# Patient Record
Sex: FEMALE | Race: WHITE | NOT HISPANIC OR LATINO
[De-identification: names, ages, dates, MRNs, and addresses within clinical notes are randomized per-mention and may not be internally consistent; named-entity substitution may affect disease eponyms.]

---

## 2018-04-20 ENCOUNTER — APPOINTMENT (OUTPATIENT)
Dept: NEUROLOGY | Facility: CLINIC | Age: 73
End: 2018-04-20
Payer: MEDICARE

## 2018-04-20 VITALS
DIASTOLIC BLOOD PRESSURE: 60 MMHG | SYSTOLIC BLOOD PRESSURE: 123 MMHG | BODY MASS INDEX: 25.3 KG/M2 | HEIGHT: 64.5 IN | WEIGHT: 150 LBS

## 2018-04-20 DIAGNOSIS — E03.9 HYPOTHYROIDISM, UNSPECIFIED: ICD-10-CM

## 2018-04-20 DIAGNOSIS — Z82.3 FAMILY HISTORY OF STROKE: ICD-10-CM

## 2018-04-20 DIAGNOSIS — M10.9 GOUT, UNSPECIFIED: ICD-10-CM

## 2018-04-20 DIAGNOSIS — Z87.39 PERSONAL HISTORY OF OTHER DISEASES OF THE MUSCULOSKELETAL SYSTEM AND CONNECTIVE TISSUE: ICD-10-CM

## 2018-04-20 DIAGNOSIS — F32.9 MAJOR DEPRESSIVE DISORDER, SINGLE EPISODE, UNSPECIFIED: ICD-10-CM

## 2018-04-20 DIAGNOSIS — Z87.442 PERSONAL HISTORY OF URINARY CALCULI: ICD-10-CM

## 2018-04-20 DIAGNOSIS — Z78.9 OTHER SPECIFIED HEALTH STATUS: ICD-10-CM

## 2018-04-20 PROCEDURE — 99204 OFFICE O/P NEW MOD 45 MIN: CPT

## 2018-04-20 RX ORDER — POTASSIUM CHLORIDE 20 MEQ
20 TABLET, EXT RELEASE, PARTICLES/CRYSTALS ORAL
Refills: 0 | Status: ACTIVE | COMMUNITY

## 2018-04-20 RX ORDER — TRAZODONE HYDROCHLORIDE 100 MG/1
100 TABLET ORAL
Refills: 0 | Status: ACTIVE | COMMUNITY

## 2018-04-20 RX ORDER — FOLIC ACID 1 MG/1
1 TABLET ORAL
Refills: 0 | Status: ACTIVE | COMMUNITY

## 2018-07-09 ENCOUNTER — APPOINTMENT (OUTPATIENT)
Dept: NEUROLOGY | Facility: CLINIC | Age: 73
End: 2018-07-09
Payer: MEDICARE

## 2018-07-09 VITALS
BODY MASS INDEX: 25.3 KG/M2 | DIASTOLIC BLOOD PRESSURE: 65 MMHG | HEIGHT: 64.5 IN | SYSTOLIC BLOOD PRESSURE: 120 MMHG | WEIGHT: 150 LBS

## 2018-07-09 PROCEDURE — 99213 OFFICE O/P EST LOW 20 MIN: CPT

## 2018-10-08 ENCOUNTER — RX RENEWAL (OUTPATIENT)
Age: 73
End: 2018-10-08

## 2018-11-12 ENCOUNTER — APPOINTMENT (OUTPATIENT)
Dept: NEUROLOGY | Facility: CLINIC | Age: 73
End: 2018-11-12
Payer: MEDICARE

## 2018-11-12 VITALS
SYSTOLIC BLOOD PRESSURE: 130 MMHG | HEIGHT: 64.5 IN | BODY MASS INDEX: 26.98 KG/M2 | DIASTOLIC BLOOD PRESSURE: 68 MMHG | WEIGHT: 160 LBS

## 2018-11-12 PROCEDURE — 99213 OFFICE O/P EST LOW 20 MIN: CPT

## 2019-02-12 ENCOUNTER — APPOINTMENT (OUTPATIENT)
Dept: NEUROLOGY | Facility: CLINIC | Age: 74
End: 2019-02-12

## 2019-04-09 ENCOUNTER — RX RENEWAL (OUTPATIENT)
Age: 74
End: 2019-04-09

## 2019-05-14 ENCOUNTER — APPOINTMENT (OUTPATIENT)
Dept: NEUROLOGY | Facility: CLINIC | Age: 74
End: 2019-05-14
Payer: MEDICARE

## 2019-05-14 VITALS — DIASTOLIC BLOOD PRESSURE: 70 MMHG | SYSTOLIC BLOOD PRESSURE: 120 MMHG | HEIGHT: 64 IN

## 2019-05-14 DIAGNOSIS — M54.32 SCIATICA, LEFT SIDE: ICD-10-CM

## 2019-05-14 PROCEDURE — 99213 OFFICE O/P EST LOW 20 MIN: CPT

## 2019-05-14 NOTE — CONSULT LETTER
[Dear  ___] : Dear  [unfilled], [Please see my note below.] : Please see my note below. [Courtesy Letter:] : I had the pleasure of seeing your patient, [unfilled], in my office today. [Consult Closing:] : Thank you very much for allowing me to participate in the care of this patient.  If you have any questions, please do not hesitate to contact me. [FreeTextEntry3] : Neto Lane M.D., Ph.D. DPN-N\par [facility]\par Neurology at [place]\par Medical Director of Stroke Services\par [facility]\par  [Sincerely,] : Sincerely,

## 2019-05-14 NOTE — ASSESSMENT
[FreeTextEntry1] : This is a 74-year-old woman with idiopathic neuropathy. She is currently stable on gabapentin. We will continue 300 mg at night. As far as her sciatic is concerned I have given her a prescription for physical therapy. If this doesn't help she may need pain management if it worsens. Regarding her confusion lites mild at this point is not affecting her activities of daily living significantly I would not treat her with medications for this I will follow it at her regular scheduled appointments for her neuropathy. I will see her back in 3 months, sooner should the need arise.

## 2019-05-14 NOTE — HISTORY OF PRESENT ILLNESS
[FreeTextEntry1] : 4/20/18:\par This is a 72-year-old woman who comes in today for evaluation of numbness and pain in her feet as well as occasionally in her hands. She states going on for a few years where she's had abnormal sensations in the feet especially at rest. She saw a podiatrist who gave her a cream. Over the last 3 months it's been acting up worse. She'll have a sensation of heat itching and pain in her feet which occasionally will also affect her hands. She was given gabapentin 300 mg at night with some relief of this however during the day she'll have another flare up of it. She hasn't on both sides and other than the gabapentin was no alleviating symptoms other than position or at rest appears to be aggravating symptoms for her complaint. She is here today for neurologic evaluation and treatment.\par \par \par Followup November 12, 2018:\par This is a 73-year-old woman returns today for neurologic followup. She is stable from a neuropathy point he appears she is taking gabapentin 300 mg only once again she is getting relief with it. Her neuropathy point of view she is stable. However, the in July after a senior she went out to the back to garden. After bending down to plan today she was unable to get up. She had to work away to the front of her urine flagged down someone that helped her. She went to [facility]. She was evaluated and told that she had near syncope and severe dehydration. She continues to have some mild weakness in both of her legs up but she is exercising daily. She had home physical therapy but is not at this time wish to go for outpatient physical therapy. She is here today for neurologic followup.\par \par Followup July 9, 2018:\par This is a 73-year-old woman who follows up today with neuropathy. She's has pain in her feet as well as her fingertips at times. I had increased her Neurontin to 300 mg twice a day. She feels that that may have made it a bit worse however when dropping the dose to 300 mg once a day she felt a bit better. Blood work was done and the only thing that was revealing was a slightly low normal B12 level. She is here today for neurologic followup.\par \par Followup May 14, 2019:\par This is a 74 room presented for followup of neuropathy. She is overall stable. She is taking gabapentin 300 mg once a day, she takes it at night. This usually helps keep her neuropathy calm. If it does act up she'll use the cream provided to her by the podiatrist. She is fairly stable from a neuropathy point of view. She also stated that 2 days ago she had an episode where she had a shooting pain into her left leg from her back. This prevented her from getting up for a few minutes. Once the pain subsided she was able to stand up and walk. The next day she had an episode at night with spasm of her feet and toes. She also relates to me that she has some mild confusion at times. This is when she is filling out forms or using the computer in a doctor's office stating that she is not computer study. It does not appear to have been so much at home. She is here today for neurologic followup.

## 2019-05-14 NOTE — PHYSICAL EXAM
[Person] : oriented to person [Time] : oriented to time [Place] : oriented to place [Remote Intact] : remote memory intact [Registration Intact] : recent registration memory intact [Span Intact] : the attention span was normal [Concentration Intact] : normal concentrating ability [Visual Intact] : visual attention was ~T not ~L decreased [Naming Objects] : no difficulty naming common objects [Repeating Phrases] : no difficulty repeating a phrase [Fluency] : fluency intact [Current Events] : adequate knowledge of current events [Past History] : adequate knowledge of personal past history [Comprehension] : comprehension intact [Cranial Nerves Oculomotor (III)] : extraocular motion intact [Cranial Nerves Optic (II)] : visual acuity intact bilaterally,  visual fields full to confrontation, pupils equal round and reactive to light [Cranial Nerves Trigeminal (V)] : facial sensation intact symmetrically [Cranial Nerves Facial (VII)] : face symmetrical [Cranial Nerves Glossopharyngeal (IX)] : tongue and palate midline [Cranial Nerves Vestibulocochlear (VIII)] : hearing was intact bilaterally [Cranial Nerves Hypoglossal (XII)] : there was no tongue deviation with protrusion [Cranial Nerves Accessory (XI - Cranial And Spinal)] : head turning and shoulder shrug symmetric [Motor Strength] : muscle strength was normal in all four extremities [Motor Tone] : muscle tone was normal in all four extremities [No Muscle Atrophy] : normal bulk in all four extremities [Involuntary Movements] : no involuntary movements were seen [Paresis Pronator Drift Left-Sided] : no pronator drift on the left [Paresis Pronator Drift Right-Sided] : no pronator drift on the right [Sensation Tactile Decrease] : light touch was intact [Proprioception] : proprioception was intact [Sensation Pain / Temperature Decrease] : pain and temperature was intact [Vibration Decrease Leg / Foot Right Ankle] : normal at  the right ankle [Vibration Decrease Leg / Foot Toes Both Feet] : normal at the toes of both feet  [Vibration Decrease Leg / Foot Left Ankle] : decreased at the left ankle [Abnormal Walk] : normal gait [Tremor] : no tremor present [Balance] : balance was intact [Coordination - Dysmetria Impaired Finger-to-Nose Bilateral] : not present [2+] : Brachioradialis right 2+ [1+] : Patella left 1+ [Sclera] : the sclera and conjunctiva were normal [PERRL With Normal Accommodation] : pupils were equal in size, round, reactive to light, with normal accommodation [Extraocular Movements] : extraocular movements were intact [No APD] : no afferent pupillary defect [No KEVIN] : no internuclear ophthalmoplegia [Full Visual Field] : full visual field

## 2019-08-13 ENCOUNTER — APPOINTMENT (OUTPATIENT)
Dept: NEUROLOGY | Facility: CLINIC | Age: 74
End: 2019-08-13

## 2021-03-09 ENCOUNTER — APPOINTMENT (OUTPATIENT)
Dept: NEUROLOGY | Facility: CLINIC | Age: 76
End: 2021-03-09
Payer: MEDICARE

## 2021-03-09 VITALS
BODY MASS INDEX: 27.31 KG/M2 | TEMPERATURE: 97.2 F | WEIGHT: 160 LBS | DIASTOLIC BLOOD PRESSURE: 70 MMHG | HEIGHT: 64 IN | SYSTOLIC BLOOD PRESSURE: 128 MMHG

## 2021-03-09 DIAGNOSIS — R42 DIZZINESS AND GIDDINESS: ICD-10-CM

## 2021-03-09 DIAGNOSIS — E78.5 HYPERLIPIDEMIA, UNSPECIFIED: ICD-10-CM

## 2021-03-09 PROCEDURE — 99214 OFFICE O/P EST MOD 30 MIN: CPT

## 2021-03-09 RX ORDER — LEVOTHYROXINE SODIUM 137 UG/1
TABLET ORAL
Refills: 0 | Status: DISCONTINUED | COMMUNITY
End: 2021-03-09

## 2021-03-09 RX ORDER — GABAPENTIN 300 MG/1
300 CAPSULE ORAL
Refills: 0 | Status: DISCONTINUED | COMMUNITY
End: 2021-03-09

## 2021-03-09 RX ORDER — FLUOXETINE HCL 10 MG
10 TABLET ORAL
Refills: 0 | Status: DISCONTINUED | COMMUNITY
End: 2021-03-09

## 2021-03-09 RX ORDER — TORSEMIDE 10 MG/1
10 TABLET ORAL
Refills: 0 | Status: DISCONTINUED | COMMUNITY
End: 2021-03-09

## 2021-03-09 RX ORDER — ATORVASTATIN CALCIUM 40 MG/1
40 TABLET, FILM COATED ORAL DAILY
Qty: 30 | Refills: 5 | Status: ACTIVE | COMMUNITY
Start: 1900-01-01 | End: 1900-01-01

## 2021-03-09 NOTE — DATA REVIEWED
[de-identified] : I was able to review the reports of an MRI of her brain that was done December 18, 2020. Per the report there was no acute shrug. There was no mass or bleeding. 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 There is evidence of small vessel ischemic changes and mild atrophy.\par \par I was able to review an MRA of the head that was done December 18, 2020. Did not show evidence for large vessel occlusion, aneurysm or arteriovenous malformation. There is no mention of any significant stenoses in the [place].\par \par I was able to review the report of an MRA of her neck done December 18, 2020. Reports no significant stenoses or dissections in either carotid artery or in the vertebral system.

## 2021-03-09 NOTE — CONSULT LETTER
[Dear  ___] : Dear  [unfilled], [Courtesy Letter:] : I had the pleasure of seeing your patient, [unfilled], in my office today. [Please see my note below.] : Please see my note below. [Consult Closing:] : Thank you very much for allowing me to participate in the care of this patient.  If you have any questions, please do not hesitate to contact me. [Sincerely,] : Sincerely, [FreeTextEntry3] : Neto Lane M.D., Ph.D. DPN-N\par [facility]\par Neurology at [place]\par Medical Director of Stroke Services\par [facility]\par

## 2021-03-09 NOTE — HISTORY OF PRESENT ILLNESS
[FreeTextEntry1] : 4/20/18:\par This is a 72-year-old woman who comes in today for evaluation of numbness and pain in her feet as well as occasionally in her hands. She states going on for a few years where she's had abnormal sensations in the feet especially at rest. She saw a podiatrist who gave her a cream. Over the last 3 months it's been acting up worse. She'll have a sensation of heat itching and pain in her feet which occasionally will also affect her hands. She was given gabapentin 300 mg at night with some relief of this however during the day she'll have another flare up of it. She hasn't on both sides and other than the gabapentin was no alleviating symptoms other than position or at rest appears to be aggravating symptoms for her complaint. She is here today for neurologic evaluation and treatment.\par \par \par Followup November 12, 2018:\par This is a 73-year-old woman returns today for neurologic followup. She is stable from a neuropathy point he appears she is taking gabapentin 300 mg only once again she is getting relief with it. Her neuropathy point of view she is stable. However, the in July after a senior she went out to the back to garden. After bending down to plan today she was unable to get up. She had to work away to the front of her urine flagged down someone that helped her. She went to [facility]. She was evaluated and told that she had near syncope and severe dehydration. She continues to have some mild weakness in both of her legs up but she is exercising daily. She had home physical therapy but is not at this time wish to go for outpatient physical therapy. She is here today for neurologic followup.\par \par Followup July 9, 2018:\par This is a 73-year-old woman who follows up today with neuropathy. She's has pain in her feet as well as her fingertips at times. I had increased her Neurontin to 300 mg twice a day. She feels that that may have made it a bit worse however when dropping the dose to 300 mg once a day she felt a bit better. Blood work was done and the only thing that was revealing was a slightly low normal B12 level. She is here today for neurologic followup.\par \par Followup May 14, 2019:\par This is a 74 room presented for followup of neuropathy. She is overall stable. She is taking gabapentin 300 mg once a day, she takes it at night. This usually helps keep her neuropathy calm. If it does act up she'll use the cream provided to her by the podiatrist. She is fairly stable from a neuropathy point of view. She also stated that 2 days ago she had an episode where she had a shooting pain into her left leg from her back. This prevented her from getting up for a few minutes. Once the pain subsided she was able to stand up and walk. The next day she had an episode at night with spasm of her feet and toes. She also relates to me that she has some mild confusion at times. This is when she is filling out forms or using the computer in a doctor's office stating that she is not computer study. It does not appear to have been so much at home. She is here today for neurologic followup.\par \par Followup March 9, 2021:\par This is a 75-year-old woman who presents today for a neurologic followup of neuropathy. Additionally she's had new complaint of a dizziness for which she went to [facility]. Regarding her neuropathy she is fairly stable. She remains on gabapentin 300 mg at night. Regarding this dizziness she states that at the end of December she had an event where she was eating a bolus to pain and felt very cold she felt as if she to the bathroom after going to the bathroom she had difficulty ambulating had weakness in both of her legs in general sensation of freezing. She called her children who are suggested for her to go to the emergency room. At the emergency room she had an MRI of the brain as well as MRA of the head and neck which will be referred to below. A pulmonary embolism was also evaluated and ruled out. She is a bit improved from her hospitalization however not 100%. She is here today for neurologic followup.

## 2021-03-09 NOTE — ASSESSMENT
[FreeTextEntry1] : This is a 75-year-old woman with history of neuropathy as well as new complaint of dizziness and old infarct seen on MRI. Regarding the neuropathy I will renew gabapentin 300 mg at night. She is tolerating this well. Regarding her dizziness as well as MRI findings I would like to add a daily baby aspirin to her treatment as well as continue atorvastatin 40 mg daily. This should help reduce secondary stroke risk factors. Also give her a prescription for physical therapy. I will see her back in the office in 3 months, sooner should the need arise.

## 2021-03-09 NOTE — PHYSICAL EXAM
[Person] : oriented to person [Place] : oriented to place [Time] : oriented to time [Remote Intact] : remote memory intact [Registration Intact] : recent registration memory intact [Span Intact] : the attention span was normal [Concentration Intact] : normal concentrating ability [Visual Intact] : visual attention was ~T not ~L decreased [Naming Objects] : no difficulty naming common objects [Repeating Phrases] : no difficulty repeating a phrase [Fluency] : fluency intact [Comprehension] : comprehension intact [Current Events] : adequate knowledge of current events [Past History] : adequate knowledge of personal past history [Cranial Nerves Optic (II)] : visual acuity intact bilaterally,  visual fields full to confrontation, pupils equal round and reactive to light [Cranial Nerves Oculomotor (III)] : extraocular motion intact [Cranial Nerves Trigeminal (V)] : facial sensation intact symmetrically [Cranial Nerves Facial (VII)] : face symmetrical [Cranial Nerves Vestibulocochlear (VIII)] : hearing was intact bilaterally [Cranial Nerves Glossopharyngeal (IX)] : tongue and palate midline [Cranial Nerves Accessory (XI - Cranial And Spinal)] : head turning and shoulder shrug symmetric [Cranial Nerves Hypoglossal (XII)] : there was no tongue deviation with protrusion [Motor Tone] : muscle tone was normal in all four extremities [Motor Strength] : muscle strength was normal in all four extremities [Involuntary Movements] : no involuntary movements were seen [No Muscle Atrophy] : normal bulk in all four extremities [Paresis Pronator Drift Right-Sided] : no pronator drift on the right [Paresis Pronator Drift Left-Sided] : no pronator drift on the left [Motor Strength Upper Extremities Bilaterally] : strength was normal in both upper extremities [Motor Strength Lower Extremities Bilaterally] : strength was normal in both lower extremities [Sensation Tactile Decrease] : light touch was intact [Sensation Pain / Temperature Decrease] : pain and temperature was intact [Vibration Decrease Leg / Foot Right Ankle] : normal at  the right ankle [Vibration Decrease Leg / Foot Left Ankle] : decreased at the left ankle [Vibration Decrease Leg / Foot Toes Both Feet] : normal at the toes of both feet  [Tremor] : no tremor present [Coordination - Dysmetria Impaired Finger-to-Nose Bilateral] : not present [2+] : Brachioradialis left 2+ [1+] : Patella left 1+ [FreeTextEntry7] : Mild proprioceptive deficits in both arms [FreeTextEntry8] : Slow but steady gait [Sclera] : the sclera and conjunctiva were normal [PERRL With Normal Accommodation] : pupils were equal in size, round, reactive to light, with normal accommodation [Extraocular Movements] : extraocular movements were intact [No APD] : no afferent pupillary defect [No KEVIN] : no internuclear ophthalmoplegia [Full Visual Field] : full visual field

## 2021-06-15 ENCOUNTER — APPOINTMENT (OUTPATIENT)
Dept: NEUROLOGY | Facility: CLINIC | Age: 76
End: 2021-06-15

## 2022-05-04 ENCOUNTER — NON-APPOINTMENT (OUTPATIENT)
Age: 77
End: 2022-05-04

## 2022-05-04 ENCOUNTER — APPOINTMENT (OUTPATIENT)
Dept: NEUROLOGY | Facility: CLINIC | Age: 77
End: 2022-05-04
Payer: MEDICARE

## 2022-05-04 VITALS
WEIGHT: 155 LBS | DIASTOLIC BLOOD PRESSURE: 70 MMHG | BODY MASS INDEX: 26.46 KG/M2 | HEIGHT: 64 IN | SYSTOLIC BLOOD PRESSURE: 124 MMHG

## 2022-05-04 DIAGNOSIS — G60.9 HEREDITARY AND IDIOPATHIC NEUROPATHY, UNSPECIFIED: ICD-10-CM

## 2022-05-04 PROCEDURE — 99214 OFFICE O/P EST MOD 30 MIN: CPT

## 2022-05-04 NOTE — CONSULT LETTER
[Dear  ___] : Dear  [unfilled], [Courtesy Letter:] : I had the pleasure of seeing your patient, [unfilled], in my office today. [Please see my note below.] : Please see my note below. [Consult Closing:] : Thank you very much for allowing me to participate in the care of this patient.  If you have any questions, please do not hesitate to contact me. [Sincerely,] : Sincerely, [FreeTextEntry3] : Neto Lane M.D., Ph.D. DPN-N\par [facility]\par Neurology at [place]\par Medical Director of Stroke Services\par [facility]\par

## 2022-05-04 NOTE — HISTORY OF PRESENT ILLNESS
[FreeTextEntry1] : 4/20/18:\par This is a 72-year-old woman who comes in today for evaluation of numbness and pain in her feet as well as occasionally in her hands. She states going on for a few years where she's had abnormal sensations in the feet especially at rest. She saw a podiatrist who gave her a cream. Over the last 3 months it's been acting up worse. She'll have a sensation of heat itching and pain in her feet which occasionally will also affect her hands. She was given gabapentin 300 mg at night with some relief of this however during the day she'll have another flare up of it. She hasn't on both sides and other than the gabapentin was no alleviating symptoms other than position or at rest appears to be aggravating symptoms for her complaint. She is here today for neurologic evaluation and treatment.\par \par \par Followup November 12, 2018:\par This is a 73-year-old woman returns today for neurologic followup. She is stable from a neuropathy point he appears she is taking gabapentin 300 mg only once again she is getting relief with it. Her neuropathy point of view she is stable. However, the in July after a senior she went out to the back to garden. After bending down to plan today she was unable to get up. She had to work away to the front of her urine flagged down someone that helped her. She went to [facility]. She was evaluated and told that she had near syncope and severe dehydration. She continues to have some mild weakness in both of her legs up but she is exercising daily. She had home physical therapy but is not at this time wish to go for outpatient physical therapy. She is here today for neurologic followup.\par \par Followup July 9, 2018:\par This is a 73-year-old woman who follows up today with neuropathy. She's has pain in her feet as well as her fingertips at times. I had increased her Neurontin to 300 mg twice a day. She feels that that may have made it a bit worse however when dropping the dose to 300 mg once a day she felt a bit better. Blood work was done and the only thing that was revealing was a slightly low normal B12 level. She is here today for neurologic followup.\par \par Followup May 14, 2019:\par This is a 74 room presented for followup of neuropathy. She is overall stable. She is taking gabapentin 300 mg once a day, she takes it at night. This usually helps keep her neuropathy calm. If it does act up she'll use the cream provided to her by the podiatrist. She is fairly stable from a neuropathy point of view. She also stated that 2 days ago she had an episode where she had a shooting pain into her left leg from her back. This prevented her from getting up for a few minutes. Once the pain subsided she was able to stand up and walk. The next day she had an episode at night with spasm of her feet and toes. She also relates to me that she has some mild confusion at times. This is when she is filling out forms or using the computer in a doctor's office stating that she is not computer study. It does not appear to have been so much at home. She is here today for neurologic followup.\par \par Followup March 9, 2021:\par This is a 75-year-old woman who presents today for a neurologic followup of neuropathy. Additionally she's had new complaint of a dizziness for which she went to [facility]. Regarding her neuropathy she is fairly stable. She remains on gabapentin 300 mg at night. Regarding this dizziness she states that at the end of December she had an event where she was eating a bolus to pain and felt very cold she felt as if she to the bathroom after going to the bathroom she had difficulty ambulating had weakness in both of her legs in general sensation of freezing. She called her children who are suggested for her to go to the emergency room. At the emergency room she had an MRI of the brain as well as MRA of the head and neck which will be referred to below. A pulmonary embolism was also evaluated and ruled out. She is a bit improved from her hospitalization however not 100%. She is here today for neurologic followup.\par \par Follow-up May 4, 2022:\par This is a 77-year-old woman who presents today for follow-up of neuropathy.  She appears to have some worsening of her neuropathy.  She is having difficulty walking feeling a bit wobbly at times.  She is using a walker for stability and control.  This followed an episode where she was without medication for almost a week and went into withdrawal.  Took her about a week to recover but when she did she noticed that her gait was not as good as it used to be.  She had an MRI of her brain done which I reviewed the report of which is showed small vessel ischemic changes as well as sinusitis but no acute stroke, mass or bleed.  She also notes that the gabapentin that she is taking does not last the full 24 hours and that she will feel numbness and sensations in her feet a few hours before taking the next dose.  She is here today for neurologic follow-up.

## 2022-05-04 NOTE — PHYSICAL EXAM
[Person] : oriented to person [Place] : oriented to place [Time] : oriented to time [Remote Intact] : remote memory intact [Registration Intact] : recent registration memory intact [Span Intact] : the attention span was normal [Concentration Intact] : normal concentrating ability [Visual Intact] : visual attention was ~T not ~L decreased [Naming Objects] : no difficulty naming common objects [Repeating Phrases] : no difficulty repeating a phrase [Fluency] : fluency intact [Comprehension] : comprehension intact [Current Events] : adequate knowledge of current events [Past History] : adequate knowledge of personal past history [Cranial Nerves Optic (II)] : visual acuity intact bilaterally,  visual fields full to confrontation, pupils equal round and reactive to light [Cranial Nerves Oculomotor (III)] : extraocular motion intact [Cranial Nerves Trigeminal (V)] : facial sensation intact symmetrically [Cranial Nerves Facial (VII)] : face symmetrical [Cranial Nerves Vestibulocochlear (VIII)] : hearing was intact bilaterally [Cranial Nerves Glossopharyngeal (IX)] : tongue and palate midline [Cranial Nerves Accessory (XI - Cranial And Spinal)] : head turning and shoulder shrug symmetric [Cranial Nerves Hypoglossal (XII)] : there was no tongue deviation with protrusion [Motor Tone] : muscle tone was normal in all four extremities [Motor Strength] : muscle strength was normal in all four extremities [Involuntary Movements] : no involuntary movements were seen [No Muscle Atrophy] : normal bulk in all four extremities [Paresis Pronator Drift Right-Sided] : no pronator drift on the right [Paresis Pronator Drift Left-Sided] : no pronator drift on the left [Motor Strength Upper Extremities Bilaterally] : strength was normal in both upper extremities [Motor Strength Lower Extremities Bilaterally] : strength was normal in both lower extremities [Sensation Tactile Decrease] : light touch was intact [Sensation Pain / Temperature Decrease] : pain and temperature was intact [Vibration Decrease Leg / Foot Right Ankle] : normal at  the right ankle [Vibration Decrease Leg / Foot Left Ankle] : decreased at the left ankle [Vibration Decrease Leg / Foot Toes Both Feet] : normal at the toes of both feet  [Position Sensation Decrease Arm / Hand Left] : impaired in the left arm [Position Sensation Decrease Leg/Foot At Level Of Toes] : impaired at the toes in the left leg [Tremor] : no tremor present [Coordination - Dysmetria Impaired Finger-to-Nose Bilateral] : not present [2+] : Brachioradialis left 2+ [1+] : Patella left 1+ [0] : Ankle jerk left 0 [FreeTextEntry7] : Mild proprioceptive deficits in both arms [FreeTextEntry8] : Slow but steady gait, using a walker [Sclera] : the sclera and conjunctiva were normal [PERRL With Normal Accommodation] : pupils were equal in size, round, reactive to light, with normal accommodation [Extraocular Movements] : extraocular movements were intact [No APD] : no afferent pupillary defect [No KEVIN] : no internuclear ophthalmoplegia [Full Visual Field] : full visual field

## 2022-05-04 NOTE — ASSESSMENT
[FreeTextEntry1] : This is a 77-year-old woman with neuropathy and some worsening gait.  She is experiencing a proprioceptive defects which are likely the cause of her gait abnormality given the fact that the brain MRI did not show stroke.  I had offered her physical therapy today but she states she has no way of getting there.  I we will also increase her gabapentin to twice a day as its not lasting long enough on a once daily dosing.  I will see her back in 6 months, sooner should the need arise.

## 2023-01-13 ENCOUNTER — RX RENEWAL (OUTPATIENT)
Age: 78
End: 2023-01-13

## 2023-05-12 ENCOUNTER — OFFICE (OUTPATIENT)
Dept: URBAN - METROPOLITAN AREA CLINIC 104 | Facility: CLINIC | Age: 78
Setting detail: OPHTHALMOLOGY
End: 2023-05-12
Payer: MEDICARE

## 2023-05-12 VITALS — HEIGHT: 55 IN

## 2023-05-12 DIAGNOSIS — H10.233: ICD-10-CM

## 2023-05-12 DIAGNOSIS — H01.002: ICD-10-CM

## 2023-05-12 DIAGNOSIS — H01.001: ICD-10-CM

## 2023-05-12 DIAGNOSIS — H16.223: ICD-10-CM

## 2023-05-12 DIAGNOSIS — H25.13: ICD-10-CM

## 2023-05-12 DIAGNOSIS — H01.004: ICD-10-CM

## 2023-05-12 DIAGNOSIS — H01.005: ICD-10-CM

## 2023-05-12 PROCEDURE — 92012 INTRM OPH EXAM EST PATIENT: CPT | Performed by: OPTOMETRIST

## 2023-05-12 ASSESSMENT — SUPERFICIAL PUNCTATE KERATITIS (SPK)
OS_SPK: 1+ 2+
OD_SPK: 1+

## 2023-05-12 ASSESSMENT — VISUAL ACUITY
OD_BCVA: 20/30
OS_BCVA: 20/50

## 2023-05-12 ASSESSMENT — REFRACTION_AUTOREFRACTION
OS_SPHERE: PLANO
OD_CYLINDER: -2.25
OS_AXIS: 109
OS_CYLINDER: -2.25
OD_SPHERE: -0.50
OD_AXIS: 069

## 2023-05-12 ASSESSMENT — REFRACTION_CURRENTRX
OD_CYLINDER: -2.50
OS_VPRISM_DIRECTION: BF
OD_SPHERE: -0.75
OD_OVR_VA: 20/
OD_AXIS: 088
OS_AXIS: 114
OS_ADD: +2.50
OS_SPHERE: +0.25
OS_OVR_VA: 20/
OD_VPRISM_DIRECTION: BF
OD_ADD: +2.50
OS_CYLINDER: -2.00

## 2023-05-12 ASSESSMENT — LID EXAM ASSESSMENTS
OS_BLEPHARITIS: LLL LUL 2+
OD_BLEPHARITIS: RLL RUL 2+

## 2023-05-12 ASSESSMENT — KERATOMETRY
OD_AXISANGLE_DEGREES: 168
OD_K1POWER_DIOPTERS: 43.21
OS_K1POWER_DIOPTERS: UNABLE
OD_K2POWER_DIOPTERS: 44.88

## 2023-05-12 ASSESSMENT — SPHEQUIV_DERIVED: OD_SPHEQUIV: -1.625

## 2023-05-12 ASSESSMENT — DECREASING TEAR LAKE - SEVERITY SCORE
OS_DEC_TEARLAKE: 1+
OD_DEC_TEARLAKE: 1+

## 2023-05-12 ASSESSMENT — AXIALLENGTH_DERIVED: OD_AL: 24.0329

## 2023-05-12 ASSESSMENT — TONOMETRY
OS_IOP_MMHG: 18
OD_IOP_MMHG: 18

## 2023-05-12 ASSESSMENT — TEAR BREAK UP TIME (TBUT)
OD_TBUT: 6 SEC
OS_TBUT: 6 SEC

## 2023-05-12 ASSESSMENT — CONFRONTATIONAL VISUAL FIELD TEST (CVF)
OD_FINDINGS: FULL
OS_FINDINGS: FULL

## 2023-05-18 RX ORDER — GABAPENTIN 300 MG/1
300 CAPSULE ORAL TWICE DAILY
Qty: 60 | Refills: 5 | Status: ACTIVE | COMMUNITY
Start: 2018-04-20 | End: 1900-01-01

## 2023-05-24 ENCOUNTER — OFFICE (OUTPATIENT)
Dept: URBAN - METROPOLITAN AREA CLINIC 104 | Facility: CLINIC | Age: 78
Setting detail: OPHTHALMOLOGY
End: 2023-05-24
Payer: MEDICARE

## 2023-05-24 ENCOUNTER — APPOINTMENT (OUTPATIENT)
Dept: PULMONOLOGY | Facility: CLINIC | Age: 78
End: 2023-05-24

## 2023-05-24 ENCOUNTER — RX ONLY (RX ONLY)
Age: 78
End: 2023-05-24

## 2023-05-24 DIAGNOSIS — H01.005: ICD-10-CM

## 2023-05-24 DIAGNOSIS — H16.223: ICD-10-CM

## 2023-05-24 DIAGNOSIS — H01.001: ICD-10-CM

## 2023-05-24 DIAGNOSIS — H01.004: ICD-10-CM

## 2023-05-24 DIAGNOSIS — H01.002: ICD-10-CM

## 2023-05-24 DIAGNOSIS — H25.13: ICD-10-CM

## 2023-05-24 PROCEDURE — 99213 OFFICE O/P EST LOW 20 MIN: CPT | Performed by: OPTOMETRIST

## 2023-05-24 ASSESSMENT — LID EXAM ASSESSMENTS
OD_BLEPHARITIS: RLL RUL 2+
OS_BLEPHARITIS: LLL LUL 2+

## 2023-05-24 ASSESSMENT — CONFRONTATIONAL VISUAL FIELD TEST (CVF)
OD_FINDINGS: FULL
OS_FINDINGS: FULL

## 2023-05-24 ASSESSMENT — REFRACTION_AUTOREFRACTION
OD_SPHERE: --1.25
OS_SPHERE: -0.50
OD_CYLINDER: -2.00
OS_AXIS: 102
OS_CYLINDER: -2.25
OD_AXIS: 077

## 2023-05-24 ASSESSMENT — TEAR BREAK UP TIME (TBUT)
OS_TBUT: 6 SEC
OD_TBUT: 6 SEC

## 2023-05-24 ASSESSMENT — REFRACTION_CURRENTRX
OD_OVR_VA: 20/
OS_ADD: +2.50
OD_AXIS: 088
OS_AXIS: 114
OD_CYLINDER: -2.50
OD_VPRISM_DIRECTION: BF
OD_ADD: +2.50
OS_SPHERE: +0.25
OD_SPHERE: -0.75
OS_CYLINDER: -2.00
OS_OVR_VA: 20/
OS_VPRISM_DIRECTION: BF

## 2023-05-24 ASSESSMENT — TONOMETRY
OS_IOP_MMHG: 20
OD_IOP_MMHG: 20

## 2023-05-24 ASSESSMENT — SUPERFICIAL PUNCTATE KERATITIS (SPK)
OS_SPK: 1+ 2+
OD_SPK: 1+

## 2023-05-24 ASSESSMENT — KERATOMETRY
OS_K2POWER_DIOPTERS: 45.92
OS_K1POWER_DIOPTERS: 44.23
OD_K2POWER_DIOPTERS: 44.76
OD_AXISANGLE_DEGREES: 175
OS_AXISANGLE_DEGREES: 032
OD_K1POWER_DIOPTERS: 43.10

## 2023-05-24 ASSESSMENT — SPHEQUIV_DERIVED: OS_SPHEQUIV: -1.625

## 2023-05-24 ASSESSMENT — DECREASING TEAR LAKE - SEVERITY SCORE
OS_DEC_TEARLAKE: 1+
OD_DEC_TEARLAKE: 1+

## 2023-05-24 ASSESSMENT — AXIALLENGTH_DERIVED: OS_AL: 23.65

## 2023-05-24 ASSESSMENT — VISUAL ACUITY
OS_BCVA: 20/50
OD_BCVA: 20/50

## 2023-05-30 ENCOUNTER — APPOINTMENT (OUTPATIENT)
Dept: PULMONOLOGY | Facility: CLINIC | Age: 78
End: 2023-05-30
Payer: MEDICARE

## 2023-05-30 ENCOUNTER — INPATIENT (INPATIENT)
Facility: HOSPITAL | Age: 78
LOS: 3 days | Discharge: HOME | DRG: 287 | End: 2023-06-03
Attending: HOSPITALIST | Admitting: HOSPITALIST
Payer: MEDICARE

## 2023-05-30 VITALS
SYSTOLIC BLOOD PRESSURE: 118 MMHG | HEIGHT: 64 IN | HEART RATE: 79 BPM | TEMPERATURE: 98 F | OXYGEN SATURATION: 98 % | DIASTOLIC BLOOD PRESSURE: 62 MMHG | RESPIRATION RATE: 20 BRPM | WEIGHT: 169.09 LBS

## 2023-05-30 VITALS — DIASTOLIC BLOOD PRESSURE: 70 MMHG | SYSTOLIC BLOOD PRESSURE: 130 MMHG

## 2023-05-30 VITALS — WEIGHT: 169 LBS | BODY MASS INDEX: 29.01 KG/M2

## 2023-05-30 VITALS — RESPIRATION RATE: 16 BRPM | OXYGEN SATURATION: 97 % | HEART RATE: 68 BPM

## 2023-05-30 DIAGNOSIS — Z90.710 ACQUIRED ABSENCE OF BOTH CERVIX AND UTERUS: Chronic | ICD-10-CM

## 2023-05-30 DIAGNOSIS — R06.02 SHORTNESS OF BREATH: ICD-10-CM

## 2023-05-30 DIAGNOSIS — M62.81 MUSCLE WEAKNESS (GENERALIZED): ICD-10-CM

## 2023-05-30 DIAGNOSIS — R06.09 OTHER FORMS OF DYSPNEA: ICD-10-CM

## 2023-05-30 LAB
ALBUMIN SERPL ELPH-MCNC: 3.9 G/DL — SIGNIFICANT CHANGE UP (ref 3.3–5.2)
ALP SERPL-CCNC: 132 U/L — HIGH (ref 40–120)
ALT FLD-CCNC: 33 U/L — HIGH
ANION GAP SERPL CALC-SCNC: 14 MMOL/L — SIGNIFICANT CHANGE UP (ref 5–17)
AST SERPL-CCNC: 50 U/L — HIGH
BASE EXCESS BLDA CALC-SCNC: 1 MMOL/L — SIGNIFICANT CHANGE UP (ref -2–3)
BASOPHILS # BLD AUTO: 0.04 K/UL — SIGNIFICANT CHANGE UP (ref 0–0.2)
BASOPHILS NFR BLD AUTO: 0.9 % — SIGNIFICANT CHANGE UP (ref 0–2)
BILIRUB SERPL-MCNC: 0.3 MG/DL — LOW (ref 0.4–2)
BLOOD GAS COMMENTS ARTERIAL: SIGNIFICANT CHANGE UP
BUN SERPL-MCNC: 21.1 MG/DL — HIGH (ref 8–20)
CALCIUM SERPL-MCNC: 8.6 MG/DL — SIGNIFICANT CHANGE UP (ref 8.4–10.5)
CHLORIDE SERPL-SCNC: 102 MMOL/L — SIGNIFICANT CHANGE UP (ref 96–108)
CO2 SERPL-SCNC: 23 MMOL/L — SIGNIFICANT CHANGE UP (ref 22–29)
CREAT SERPL-MCNC: 1.53 MG/DL — HIGH (ref 0.5–1.3)
CRP SERPL-MCNC: 45 MG/L — HIGH
D DIMER BLD IA.RAPID-MCNC: 365 NG/ML DDU — HIGH
EGFR: 34 ML/MIN/1.73M2 — LOW
EOSINOPHIL # BLD AUTO: 0.2 K/UL — SIGNIFICANT CHANGE UP (ref 0–0.5)
EOSINOPHIL NFR BLD AUTO: 4.3 % — SIGNIFICANT CHANGE UP (ref 0–6)
ERYTHROCYTE [SEDIMENTATION RATE] IN BLOOD: 48 MM/HR — HIGH (ref 0–20)
FOLATE SERPL-MCNC: >20 NG/ML — SIGNIFICANT CHANGE UP
GAS PNL BLDA: SIGNIFICANT CHANGE UP
GIANT PLATELETS BLD QL SMEAR: PRESENT — SIGNIFICANT CHANGE UP
GLUCOSE SERPL-MCNC: 197 MG/DL — HIGH (ref 70–99)
HCO3 BLDA-SCNC: 25 MMOL/L — SIGNIFICANT CHANGE UP (ref 21–28)
HCT VFR BLD CALC: 37.2 % — SIGNIFICANT CHANGE UP (ref 34.5–45)
HGB BLD-MCNC: 11.8 G/DL — SIGNIFICANT CHANGE UP (ref 11.5–15.5)
HOROWITZ INDEX BLDA+IHG-RTO: 21 — SIGNIFICANT CHANGE UP
LYMPHOCYTES # BLD AUTO: 1.11 K/UL — SIGNIFICANT CHANGE UP (ref 1–3.3)
LYMPHOCYTES # BLD AUTO: 23.5 % — SIGNIFICANT CHANGE UP (ref 13–44)
MANUAL SMEAR VERIFICATION: SIGNIFICANT CHANGE UP
MCHC RBC-ENTMCNC: 29.1 PG — SIGNIFICANT CHANGE UP (ref 27–34)
MCHC RBC-ENTMCNC: 31.7 GM/DL — LOW (ref 32–36)
MCV RBC AUTO: 91.6 FL — SIGNIFICANT CHANGE UP (ref 80–100)
MONOCYTES # BLD AUTO: 0.41 K/UL — SIGNIFICANT CHANGE UP (ref 0–0.9)
MONOCYTES NFR BLD AUTO: 8.7 % — SIGNIFICANT CHANGE UP (ref 2–14)
MYELOCYTES NFR BLD: 2.6 % — HIGH (ref 0–0)
NEUTROPHILS # BLD AUTO: 2.76 K/UL — SIGNIFICANT CHANGE UP (ref 1.8–7.4)
NEUTROPHILS NFR BLD AUTO: 58.3 % — SIGNIFICANT CHANGE UP (ref 43–77)
NT-PROBNP SERPL-SCNC: 323 PG/ML — HIGH (ref 0–300)
PCO2 BLDA: 36 MMHG — SIGNIFICANT CHANGE UP (ref 32–45)
PH BLDA: 7.45 — SIGNIFICANT CHANGE UP (ref 7.35–7.45)
PLAT MORPH BLD: NORMAL — SIGNIFICANT CHANGE UP
PLATELET # BLD AUTO: 175 K/UL — SIGNIFICANT CHANGE UP (ref 150–400)
PO2 BLDA: 76 MMHG — LOW (ref 83–108)
POTASSIUM SERPL-MCNC: 3.6 MMOL/L — SIGNIFICANT CHANGE UP (ref 3.5–5.3)
POTASSIUM SERPL-SCNC: 3.6 MMOL/L — SIGNIFICANT CHANGE UP (ref 3.5–5.3)
PROT SERPL-MCNC: 6.8 G/DL — SIGNIFICANT CHANGE UP (ref 6.6–8.7)
RBC # BLD: 4.06 M/UL — SIGNIFICANT CHANGE UP (ref 3.8–5.2)
RBC # FLD: 15.1 % — HIGH (ref 10.3–14.5)
RBC BLD AUTO: NORMAL — SIGNIFICANT CHANGE UP
SAO2 % BLDA: 97.4 % — SIGNIFICANT CHANGE UP (ref 94–98)
SODIUM SERPL-SCNC: 139 MMOL/L — SIGNIFICANT CHANGE UP (ref 135–145)
T4 AB SER-ACNC: 7.9 UG/DL — SIGNIFICANT CHANGE UP (ref 4.5–12)
TROPONIN T SERPL-MCNC: <0.01 NG/ML — SIGNIFICANT CHANGE UP (ref 0–0.06)
TSH SERPL-MCNC: 9.01 UIU/ML — HIGH (ref 0.27–4.2)
VARIANT LYMPHS # BLD: 1.7 % — SIGNIFICANT CHANGE UP (ref 0–6)
VIT B12 SERPL-MCNC: 666 PG/ML — SIGNIFICANT CHANGE UP (ref 232–1245)
WBC # BLD: 4.73 K/UL — SIGNIFICANT CHANGE UP (ref 3.8–10.5)
WBC # FLD AUTO: 4.73 K/UL — SIGNIFICANT CHANGE UP (ref 3.8–10.5)

## 2023-05-30 PROCEDURE — 99285 EMERGENCY DEPT VISIT HI MDM: CPT | Mod: GC

## 2023-05-30 PROCEDURE — 99223 1ST HOSP IP/OBS HIGH 75: CPT

## 2023-05-30 PROCEDURE — 70450 CT HEAD/BRAIN W/O DYE: CPT | Mod: 26,MA

## 2023-05-30 PROCEDURE — 99204 OFFICE O/P NEW MOD 45 MIN: CPT

## 2023-05-30 PROCEDURE — 71046 X-RAY EXAM CHEST 2 VIEWS: CPT | Mod: 26

## 2023-05-30 PROCEDURE — 93010 ELECTROCARDIOGRAM REPORT: CPT

## 2023-05-30 RX ORDER — ALLOPURINOL 100 MG/1
100 TABLET ORAL
Refills: 0 | Status: COMPLETED | COMMUNITY
End: 2023-05-30

## 2023-05-30 RX ORDER — ATORVASTATIN CALCIUM 80 MG/1
40 TABLET, FILM COATED ORAL AT BEDTIME
Refills: 0 | Status: DISCONTINUED | OUTPATIENT
Start: 2023-05-30 | End: 2023-06-03

## 2023-05-30 RX ORDER — ACETAMINOPHEN 500 MG
650 TABLET ORAL EVERY 6 HOURS
Refills: 0 | Status: DISCONTINUED | OUTPATIENT
Start: 2023-05-30 | End: 2023-06-03

## 2023-05-30 RX ORDER — DICYCLOMINE HYDROCHLORIDE 10 MG/1
10 CAPSULE ORAL
Refills: 0 | Status: ACTIVE | COMMUNITY

## 2023-05-30 RX ORDER — TRAZODONE HCL 50 MG
100 TABLET ORAL AT BEDTIME
Refills: 0 | Status: DISCONTINUED | OUTPATIENT
Start: 2023-05-30 | End: 2023-06-03

## 2023-05-30 RX ORDER — FLUOXETINE HCL 10 MG
10 CAPSULE ORAL DAILY
Refills: 0 | Status: DISCONTINUED | OUTPATIENT
Start: 2023-05-30 | End: 2023-06-03

## 2023-05-30 RX ORDER — ONDANSETRON 8 MG/1
4 TABLET, FILM COATED ORAL EVERY 8 HOURS
Refills: 0 | Status: DISCONTINUED | OUTPATIENT
Start: 2023-05-30 | End: 2023-06-03

## 2023-05-30 RX ORDER — ATORVASTATIN CALCIUM 80 MG/1
1 TABLET, FILM COATED ORAL
Refills: 0 | DISCHARGE

## 2023-05-30 RX ORDER — SODIUM CHLORIDE 9 MG/ML
3 INJECTION INTRAMUSCULAR; INTRAVENOUS; SUBCUTANEOUS EVERY 8 HOURS
Refills: 0 | Status: DISCONTINUED | OUTPATIENT
Start: 2023-05-30 | End: 2023-06-03

## 2023-05-30 RX ORDER — FUROSEMIDE 80 MG/1
TABLET ORAL
Refills: 0 | Status: ACTIVE | COMMUNITY

## 2023-05-30 RX ORDER — LANOLIN ALCOHOL/MO/W.PET/CERES
3 CREAM (GRAM) TOPICAL AT BEDTIME
Refills: 0 | Status: DISCONTINUED | OUTPATIENT
Start: 2023-05-30 | End: 2023-06-03

## 2023-05-30 RX ORDER — HEPARIN SODIUM 5000 [USP'U]/ML
5000 INJECTION INTRAVENOUS; SUBCUTANEOUS EVERY 12 HOURS
Refills: 0 | Status: DISCONTINUED | OUTPATIENT
Start: 2023-05-30 | End: 2023-06-03

## 2023-05-30 RX ORDER — LEVOTHYROXINE SODIUM 137 UG/1
TABLET ORAL
Refills: 0 | Status: ACTIVE | COMMUNITY

## 2023-05-30 RX ORDER — GABAPENTIN 400 MG/1
300 CAPSULE ORAL
Refills: 0 | Status: DISCONTINUED | OUTPATIENT
Start: 2023-05-30 | End: 2023-06-03

## 2023-05-30 RX ORDER — FOLIC ACID 0.8 MG
1 TABLET ORAL DAILY
Refills: 0 | Status: DISCONTINUED | OUTPATIENT
Start: 2023-05-30 | End: 2023-06-03

## 2023-05-30 RX ORDER — GABAPENTIN 400 MG/1
1 CAPSULE ORAL
Refills: 0 | DISCHARGE

## 2023-05-30 RX ORDER — FLUOXETINE HYDROCHLORIDE 40 MG/1
CAPSULE ORAL
Refills: 0 | Status: ACTIVE | COMMUNITY

## 2023-05-30 RX ADMIN — Medication 20 MILLIGRAM(S): at 23:33

## 2023-05-30 RX ADMIN — ATORVASTATIN CALCIUM 40 MILLIGRAM(S): 80 TABLET, FILM COATED ORAL at 23:32

## 2023-05-30 RX ADMIN — Medication 100 MILLIGRAM(S): at 23:33

## 2023-05-30 RX ADMIN — SODIUM CHLORIDE 3 MILLILITER(S): 9 INJECTION INTRAMUSCULAR; INTRAVENOUS; SUBCUTANEOUS at 23:33

## 2023-05-30 NOTE — H&P ADULT - NSICDXFAMILYHX_GEN_ALL_CORE_FT
FAMILY HISTORY:  Sibling  Still living? Unknown  Family history of gastric cancer, Age at diagnosis: Age Unknown

## 2023-05-30 NOTE — ED PROVIDER NOTE - ATTENDING CONTRIBUTION TO CARE
I, Octavio Davila, performed a face to face bedside interview with this patient regarding history of present illness, review of symptoms and relevant past medical, social and family history.  I completed an independent physical examination. I have communicated the patient’s plan of care and disposition with the resident.  78 year old female with PMH HTN, hypothyroid presents with VALENZUELA and feeling off balance. She reports that she has been feeling this for several weeks. No Sx ate rest, but walks 10-15 feet and has ot stop and catch her breath. IN addition, he states that she feels that she is swaying and off balance  Gen: NAD, well appearing  CV: RRR  Pul: CTA b/l  Abd: Soft, non-distended, non-tender  Neuro: no focal deficits  Pt to be admitted for dyspnea on exertion, suspect anginal equivalent, ataxia

## 2023-05-30 NOTE — H&P ADULT - BP NONINVASIVE SYSTOLIC (MM HG)
Do You Have A Family History Of Psoriasis?: no
How Severe Is Your Psoriasis?: moderate
Is This A New Presentation, Or A Follow-Up?: Psoriasis
160

## 2023-05-30 NOTE — H&P ADULT - HISTORY OF PRESENT ILLNESS
77 y/o female with hx of subclinical hypothyroidism, CKD, Henoch Schonlein purpura in 1/23 s/p course of steroids with resolution, chronic LE edema on Bumex, Depression, HLD, Insomnia who was sent in from Pulmonary office after presenting there with c/p progressive VALENZUELA to the point were she cant walk more than 10-15 feet without feeling SOB. No hypoxia noted during todays exam at Pulmonary office. She admits to chest tightness/heaviness with the VALENZUELA as well as dizziness which both get better when she stops and rests. Denies any recent infections, travel, sick contacts, or changes in her mediations. She denies any other neurological complaints, no falls and no hx of VTE/CAD/CVA. She currently lives alone using a walker intermittently. Denies any /GI complaints, and her weight has not changed this year. In the ED vitals stable, no hypoxia noted at rest or with exertion. No evidence of ACS, PNA/CHF or anemia. Age adjusted ddimer and ESR kamari.. CTH negative, EKG non-ischemic. Patient with reproducible exertional dyspnea. Patient seen with Son, who is ED Physician at bedside.

## 2023-05-30 NOTE — ED PROVIDER NOTE - CLINICAL SUMMARY MEDICAL DECISION MAKING FREE TEXT BOX
Pt w/ pmhx of chf, htn, hld sent from pulmonology for further testing due to generalized weakness and VALENZUELA. Labs, CXR, NIF, CT head ordered.

## 2023-05-30 NOTE — ED ADULT TRIAGE NOTE - BMI (KG/M2)
Patient attended Phase 2 Cardiac Rehab Exercise Session. Further documentation will be completed in Cardiac Science/[employer] System and will be scanned into the medical record upon discharge.  
29

## 2023-05-30 NOTE — ED PROVIDER NOTE - OBJECTIVE STATEMENT
79 y/o F pt w/ PMHx of CHF, HTN, HLD presents w/ generalized weakness and VALENZUELA for 1 month. Pt reports mild worsening in her chronic pedal edema. Pt was at her pulmonologist today and was told to come to the ER for further testing. She denies chest pain, numbness, tingling, focal weakness, N/V/D, fever, chills, or any other complaints.

## 2023-05-30 NOTE — ED ADULT NURSE NOTE - OBJECTIVE STATEMENT
pt is stable. placed on monitor , no distress at this time , IV line and labs done and sent, pending results , safety maintained

## 2023-05-30 NOTE — H&P ADULT - NSICDXPASTMEDICALHX_GEN_ALL_CORE_FT
PAST MEDICAL HISTORY:  Depressed     Hypertension     Hypothyroidism     Kidney stone     Mycosis fungoides

## 2023-05-30 NOTE — HISTORY OF PRESENT ILLNESS
[Never] : never [Awakes Unrefreshed] : awakes unrefreshed [Awakes with Dry Mouth] : awakes with dry mouth [Snoring] : snoring [TextBox_4] : 77y/o  female  born in [place]  never smoker (  no second smoke )    retired teacher   h/o  hypothyroid    first visit  for shortness of breath per friend over one month has increased \par \par - sob started  about  2 years  now started to get worse\par -cannot do stairs  well \par - dizziness and balance is poor  \par - not known if covid hx    vaccination  + \par -cardiology    - work up  ?  [place]\par -lives alone \par -  daughter feels    her  breathing is labored -   1/2  block with  VALENZUELA     twelve  steps   with sob\par - ?snoring      daytime + nap x 1 \par - no am headache   +  fatigue and increase in weakness  [Awakes with Headache] : does not awaken with headache [Fatigue] : no fatigue [TextBox_77] : 10 [TextBox_79] : 8 am  [TextBox_83] : 1

## 2023-05-30 NOTE — PHYSICAL EXAM
[Enlarged Base of the Tongue] : enlarged base of the tongue [IV] : Mallampati Class: IV [Normal Appearance] : normal appearance [Supple] : supple [No Neck Mass] : no neck mass [No JVD] : no jvd [Normal Rate/Rhythm] : normal rate/rhythm [Normal S1, S2] : normal s1, s2 [Clear to Auscultation Bilaterally] : clear to auscultation bilaterally [Benign] : benign [Not Tender] : not tender [No Masses] : no masses [Soft] : soft [No Hernias] : no hernias [Normal Bowel Sounds] : normal bowel sounds [No Clubbing] : no clubbing [No Rash] : no rash [No Focal Deficits] : no focal deficits [Normal Affect] : normal affect [No Resp Distress] : no resp distress [Kyphosis] : kyphosis [TextBox_2] : pleasant f  no distress speaking full sentences  mild  clearing cough   post nasal drip + [TextBox_11] : crowded    mouth    dry [TextBox_99] : slow [TextBox_105] : tightening of skin  fingers  nail changes

## 2023-05-30 NOTE — ASSESSMENT
[FreeTextEntry1] : 77y/o female\par \par 1- acute on chronic sob\par 2- weakness and   dizziness\par 3- obesity  ? sleep disordered breathing \par 4- vaccinations per primary  covid +\par \par Recommendations\par 1- patient will need  neurologic  + rheumatologic work up  + ENT upper airway inspection    NIF VC cxr\par 2- has   cardiologist in  [place]\par 3- fatigue and weakness with high risk  for fall and  worsening   respiratory failure  - she lives alone and cannot ambulate  1/2  hallway  without  severe  sob \par \par -  - discussed to call ambulance to  hospital for further  work up   but prefers to be driven to  [place] by her friend -   \par \par 4- stat EKG       d -dimer    rhem  neuro consults      for worsening  neuromuscular weakness ?     room air abg  to evaluate for  hypercapnia \par \par agrees to go to ED - after discussion with son    [facility] is favored

## 2023-05-30 NOTE — H&P ADULT - TIME BILLING
Reviewing ED w/u, examining patient, reviewing medications/doing medication reconciliation, reviewing lab tests/imaging, furnishing H&P, Discussing results and plan with patient, Son, and ED staff.

## 2023-05-30 NOTE — REVIEW OF SYSTEMS
[Fatigue] : fatigue [Postnasal Drip] : postnasal drip [Wheezing] : wheezing [SOB on Exertion] : sob on exertion [Edema] : edema [Thyroid Problem] : thyroid problem [Obesity] : obesity [Fever] : no fever [Recent Wt Gain (___ Lbs)] : ~T no recent weight gain [Chills] : no chills [Recent Wt Loss (___ Lbs)] : ~T no recent weight loss [Epistaxis] : no epistaxis [Sore Throat] : no sore throat [Dry Mouth] : no dry mouth [Cough] : no cough [Hemoptysis] : no hemoptysis [Dyspnea] : no dyspnea [Chest Discomfort] : no chest discomfort [Leg Cramps] : no leg cramps [Palpitations] : no palpitations [GERD] : no gerd [Abdominal Pain] : no abdominal pain [Nausea] : no nausea [Vomiting] : no vomiting [Myalgias] : no myalgias [Chronic Pain] : no chronic pain [Rash] : no rash [Diabetes] : no diabetes [TextBox_44] : ankle   edema

## 2023-05-30 NOTE — ED ADULT NURSE NOTE - HIV OFFER
Chief Complaint   Patient presents with   • Loss of Consciousness     LOC while walking with friends     LOC while walking with friends. Laceration noted to lip and chin. Upon EMS arrival, BP 71/42, blood glucose 120. EMS administered 500mL NS. BP currently 144/92.   
Opt out

## 2023-05-30 NOTE — PROCEDURE
[FreeTextEntry1] : at rest     sats 96%  HR   90\par \par walk   few steps in [place]  1/2 block equivalent  worsening balance and severe sob  HR  112   sats 96%

## 2023-05-30 NOTE — H&P ADULT - ASSESSMENT
77 y/o female with VALENZUELA/chest tightness, mild LFT elevation Hx of  CKD-4, HLD, subclincal hypothyroidism, Chronic LE edema, HSP, Insomnia, depression    VALENZUELA/Exertional CP:  -Concern for exertional Angina based on non-diagnostic w/u thus far  -No evidence of CHF/PNA.  -Pulm HTN?  -Age adjusted ddimer normal  -RA saturation normal, no hx of smoking/COPD  -CXR/EKG as above  -2D echo pending   -Await cardiology eval for inpatient ischemic w/u  -Dizziness associated with exertional dyspnea, ischemic induced arrythmia?  -Monitor on tele, MR per family request-Neurology pending if MRI abnormal  -OOB, ambulate, fall risk, PT program  -DVT-P w/ sub Q heparin, VC boots while in bed    CKD-4:  -Avoid nephrotoxins  -Hold bumex with dry MM   -Repeat BMP in AM  -renally dose meds  -avoid nephrotoxins  -Nephrology consult with possible LHC and risk of QUOC    HLD:  -Statin    Chronic LE edema:  -No hx of VTE, no calf pain  -Prior LE dopplers reported as neg  -Lymphedema? Pulm HTN?    Sub-clinical hypothyroidism:  -TSH 9  -Check free T4  -no evidence of myxedema     HSP:  -Dx in 1/23, s/p steroids  -No new lesions noted  -ESR age adjusted is normal    Insomnia:  -Cont. Trazodone     Depression:  -Denies SI  -Cont. Fluoxetine    Mild LFT elevation:  -Statin induced?  -Check CPK  -Hepatitis profile   -RUQ patelo    Discussed with Patient, ED staff, Son at bedside who agrees with above plan of care.

## 2023-05-30 NOTE — PATIENT PROFILE ADULT - FALL HARM RISK - HARM RISK INTERVENTIONS

## 2023-05-31 LAB
ANION GAP SERPL CALC-SCNC: 12 MMOL/L — SIGNIFICANT CHANGE UP (ref 5–17)
APPEARANCE UR: CLEAR — SIGNIFICANT CHANGE UP
BACTERIA # UR AUTO: ABNORMAL
BILIRUB UR-MCNC: NEGATIVE — SIGNIFICANT CHANGE UP
BUN SERPL-MCNC: 21.1 MG/DL — HIGH (ref 8–20)
CALCIUM SERPL-MCNC: 8.6 MG/DL — SIGNIFICANT CHANGE UP (ref 8.4–10.5)
CHLORIDE SERPL-SCNC: 106 MMOL/L — SIGNIFICANT CHANGE UP (ref 96–108)
CK SERPL-CCNC: 63 U/L — SIGNIFICANT CHANGE UP (ref 25–170)
CO2 SERPL-SCNC: 23 MMOL/L — SIGNIFICANT CHANGE UP (ref 22–29)
COLOR SPEC: YELLOW — SIGNIFICANT CHANGE UP
CREAT SERPL-MCNC: 1.39 MG/DL — HIGH (ref 0.5–1.3)
DIFF PNL FLD: ABNORMAL
EGFR: 38 ML/MIN/1.73M2 — LOW
EPI CELLS # UR: SIGNIFICANT CHANGE UP
GLUCOSE SERPL-MCNC: 148 MG/DL — HIGH (ref 70–99)
GLUCOSE UR QL: NEGATIVE MG/DL — SIGNIFICANT CHANGE UP
HAV IGM SER-ACNC: SIGNIFICANT CHANGE UP
HBV CORE IGM SER-ACNC: SIGNIFICANT CHANGE UP
HBV SURFACE AG SER-ACNC: SIGNIFICANT CHANGE UP
HCT VFR BLD CALC: 35.4 % — SIGNIFICANT CHANGE UP (ref 34.5–45)
HCV AB S/CO SERPL IA: 0.11 S/CO — SIGNIFICANT CHANGE UP (ref 0–0.99)
HCV AB SERPL-IMP: SIGNIFICANT CHANGE UP
HGB BLD-MCNC: 11.2 G/DL — LOW (ref 11.5–15.5)
KETONES UR-MCNC: NEGATIVE — SIGNIFICANT CHANGE UP
LEUKOCYTE ESTERASE UR-ACNC: NEGATIVE — SIGNIFICANT CHANGE UP
MAGNESIUM SERPL-MCNC: 2.2 MG/DL — SIGNIFICANT CHANGE UP (ref 1.8–2.6)
MCHC RBC-ENTMCNC: 28.9 PG — SIGNIFICANT CHANGE UP (ref 27–34)
MCHC RBC-ENTMCNC: 31.6 GM/DL — LOW (ref 32–36)
MCV RBC AUTO: 91.2 FL — SIGNIFICANT CHANGE UP (ref 80–100)
NITRITE UR-MCNC: NEGATIVE — SIGNIFICANT CHANGE UP
PH UR: 6 — SIGNIFICANT CHANGE UP (ref 5–8)
PHOSPHATE SERPL-MCNC: 2.6 MG/DL — SIGNIFICANT CHANGE UP (ref 2.4–4.7)
PLATELET # BLD AUTO: 160 K/UL — SIGNIFICANT CHANGE UP (ref 150–400)
POTASSIUM SERPL-MCNC: 3.2 MMOL/L — LOW (ref 3.5–5.3)
POTASSIUM SERPL-SCNC: 3.2 MMOL/L — LOW (ref 3.5–5.3)
PROT UR-MCNC: 15
RBC # BLD: 3.88 M/UL — SIGNIFICANT CHANGE UP (ref 3.8–5.2)
RBC # FLD: 15.1 % — HIGH (ref 10.3–14.5)
RBC CASTS # UR COMP ASSIST: ABNORMAL /HPF (ref 0–4)
SODIUM SERPL-SCNC: 141 MMOL/L — SIGNIFICANT CHANGE UP (ref 135–145)
SP GR SPEC: 1.01 — SIGNIFICANT CHANGE UP (ref 1.01–1.02)
UROBILINOGEN FLD QL: NEGATIVE MG/DL — SIGNIFICANT CHANGE UP
WBC # BLD: 5.61 K/UL — SIGNIFICANT CHANGE UP (ref 3.8–10.5)
WBC # FLD AUTO: 5.61 K/UL — SIGNIFICANT CHANGE UP (ref 3.8–10.5)
WBC UR QL: SIGNIFICANT CHANGE UP /HPF (ref 0–5)

## 2023-05-31 PROCEDURE — 93306 TTE W/DOPPLER COMPLETE: CPT | Mod: 26

## 2023-05-31 PROCEDURE — 99222 1ST HOSP IP/OBS MODERATE 55: CPT

## 2023-05-31 PROCEDURE — 76775 US EXAM ABDO BACK WALL LIM: CPT | Mod: 26,59

## 2023-05-31 PROCEDURE — 99233 SBSQ HOSP IP/OBS HIGH 50: CPT

## 2023-05-31 PROCEDURE — 76705 ECHO EXAM OF ABDOMEN: CPT | Mod: 26

## 2023-05-31 RX ORDER — LEVOTHYROXINE SODIUM 125 MCG
25 TABLET ORAL DAILY
Refills: 0 | Status: DISCONTINUED | OUTPATIENT
Start: 2023-05-31 | End: 2023-06-03

## 2023-05-31 RX ORDER — POTASSIUM CHLORIDE 20 MEQ
40 PACKET (EA) ORAL ONCE
Refills: 0 | Status: COMPLETED | OUTPATIENT
Start: 2023-05-31 | End: 2023-05-31

## 2023-05-31 RX ORDER — LEVOTHYROXINE SODIUM 125 MCG
25 TABLET ORAL DAILY
Refills: 0 | Status: DISCONTINUED | OUTPATIENT
Start: 2023-05-31 | End: 2023-05-31

## 2023-05-31 RX ADMIN — SODIUM CHLORIDE 3 MILLILITER(S): 9 INJECTION INTRAMUSCULAR; INTRAVENOUS; SUBCUTANEOUS at 22:28

## 2023-05-31 RX ADMIN — SODIUM CHLORIDE 3 MILLILITER(S): 9 INJECTION INTRAMUSCULAR; INTRAVENOUS; SUBCUTANEOUS at 11:45

## 2023-05-31 RX ADMIN — HEPARIN SODIUM 5000 UNIT(S): 5000 INJECTION INTRAVENOUS; SUBCUTANEOUS at 06:12

## 2023-05-31 RX ADMIN — ATORVASTATIN CALCIUM 40 MILLIGRAM(S): 80 TABLET, FILM COATED ORAL at 22:39

## 2023-05-31 RX ADMIN — GABAPENTIN 300 MILLIGRAM(S): 400 CAPSULE ORAL at 17:42

## 2023-05-31 RX ADMIN — Medication 100 MILLIGRAM(S): at 22:39

## 2023-05-31 RX ADMIN — Medication 10 MILLIGRAM(S): at 09:15

## 2023-05-31 RX ADMIN — Medication 600 MILLIGRAM(S): at 02:59

## 2023-05-31 RX ADMIN — SODIUM CHLORIDE 3 MILLILITER(S): 9 INJECTION INTRAMUSCULAR; INTRAVENOUS; SUBCUTANEOUS at 05:52

## 2023-05-31 RX ADMIN — GABAPENTIN 300 MILLIGRAM(S): 400 CAPSULE ORAL at 00:46

## 2023-05-31 RX ADMIN — Medication 20 MILLIGRAM(S): at 09:14

## 2023-05-31 RX ADMIN — Medication 40 MILLIEQUIVALENT(S): at 09:15

## 2023-05-31 RX ADMIN — HEPARIN SODIUM 5000 UNIT(S): 5000 INJECTION INTRAVENOUS; SUBCUTANEOUS at 17:42

## 2023-05-31 RX ADMIN — Medication 600 MILLIGRAM(S): at 17:42

## 2023-05-31 RX ADMIN — GABAPENTIN 300 MILLIGRAM(S): 400 CAPSULE ORAL at 06:11

## 2023-05-31 RX ADMIN — Medication 1 MILLIGRAM(S): at 09:14

## 2023-05-31 NOTE — CONSULT NOTE ADULT - SUBJECTIVE AND OBJECTIVE BOX
[facility], [facility]                              [address] [place] [address]                                                 PHONE: [phone]                                                 FAX: [phone]  ------------------------------------------------------------------------------------------------    Chief complaint: VALENZUELA, shortness of breath    78y Female with past medical history as under who was sent in from pulmonary office after presenting there with c/p progressive VALENZUELA to the point were she cant walk more than 10-15 feet without feeling SOB. As per pt, no hypoxia noted at Pulmonary office.   As per chart, she reported chest tightness/heaviness with the VALENZUELA as well as dizziness which both get better when she stops and rests although pt did not report that to me.   In the ED vitals stable, no hypoxia noted at rest or with exertion. No evidence of ACS, PNA/CHF or anemia. Age adjusted ddimer and ESR kamari.. CTH negative, EKG non-ischemic.  At the time of evaluation, pt reports feeling better. She reports no chest pain. She follows with Dr. Galindo    PAST MEDICAL & SURGICAL HISTORY:  Hypertension      Kidney stone      Hypothyroidism      Depressed      Mycosis fungoides      S/P hysterectomy          codeine (Faint)  penicillin (Unknown)      Vital Signs Last 24 Hrs  T(C): 36.6 (31 May 2023 07:54), Max: 37.1 (30 May 2023 21:48)  T(F): 97.8 (31 May 2023 07:54), Max: 98.8 (30 May 2023 23:20)  HR: 70 (31 May 2023 07:54) (68 - 79)  BP: 134/70 (31 May 2023 07:54) (118/62 - 160/80)  BP(mean): 105 (30 May 2023 22:52) (105 - 105)  RR: 18 (31 May 2023 07:54) (18 - 20)  SpO2: 96% (31 May 2023 07:54) (95% - 98%)    Parameters below as of 31 May 2023 07:54  Patient On (Oxygen Delivery Method): room air        RELEVENT PHYSICAL EXAM:  Cardiovascular: regular S1, S2  Respiratory: Lungs clear to auscultation; no crepitations, no wheeze  Musculoskeletal: No edema    LABS:                        11.2   5.61  )-----------( 160      ( 31 May 2023 02:20 )             35.4     05-31    141  |  106  |  21.1<H>  ----------------------------<  148<H>  3.2<L>   |  23.0  |  1.39<H>    Ca    8.6      31 May 2023 02:20  Phos  2.6     05-31  Mg     2.2     05-31    TPro  6.8  /  Alb  3.9  /  TBili  0.3<L>  /  DBili  x   /  AST  50<H>  /  ALT  33<H>  /  AlkPhos  132<H>  05-30    CARDIAC MARKERS ( 31 May 2023 02:20 )  x     / x     / 63 U/L / x     / x      CARDIAC MARKERS ( 30 May 2023 18:17 )  x     / <0.01 ng/mL / x     / x     / x              RADIOLOGY & ADDITIONAL STUDIES: (reviewed)  CXR was independently visualized/reviewed and demonstrated: clear lungs    CARDIOLOGY TESTING:(reviewed)     ECG (Independent visualization): NSR     TELEMETRY independently visualized/reviewed and demonstrated : NSR 60-80    MEDICATIONS:(reviewed)  Home Medications:  Home Medications:  acetaminophen 325 mg oral tablet: 2 tab(s) orally every 6 hours, As needed, Mild Pain (06 Feb 2015 17:32)  bumetanide 0.5 mg oral tablet: 1 tab(s) orally once a day (30 May 2023 23:06)  Colace sodium 100 mg oral capsule: 1 cap(s) orally 2 times a day  (11 Feb 2015 10:40)  dicyclomine 10 mg oral capsule: 2 orally once a day (30 May 2023 23:06)  FLUoxetine 10 mg oral capsule: 1 cap(s) orally once a day (06 Feb 2015 17:32)  Lipitor 40 mg oral tablet: 1 orally once a day (at bedtime) (30 May 2023 23:06)  multivitamin: 1 tab(s) orally once a day (11 Feb 2015 10:40)  Neurontin 300 mg oral capsule: 1 tab(s) orally 2 times a day (30 May 2023 23:06)      MEDICATIONS  (STANDING):  atorvastatin 40 milliGRAM(s) Oral at bedtime  dicyclomine 20 milliGRAM(s) Oral daily  FLUoxetine 10 milliGRAM(s) Oral daily  folic acid 1 milliGRAM(s) Oral daily  gabapentin 300 milliGRAM(s) Oral two times a day  guaiFENesin  milliGRAM(s) Oral every 12 hours  heparin   Injectable 5000 Unit(s) SubCutaneous every 12 hours  potassium chloride   Powder 40 milliEquivalent(s) Oral once  sodium chloride 0.9% lock flush 3 milliLiter(s) IV Push every 8 hours  traZODone 100 milliGRAM(s) Oral at bedtime    MEDICATIONS  (PRN):  acetaminophen     Tablet .. 650 milliGRAM(s) Oral every 6 hours PRN Temp greater or equal to 38C (100.4F), Mild Pain (1 - 3)  aluminum hydroxide/magnesium hydroxide/simethicone Suspension 30 milliLiter(s) Oral every 4 hours PRN Dyspepsia  melatonin 3 milliGRAM(s) Oral at bedtime PRN Insomnia  ondansetron Injectable 4 milliGRAM(s) IV Push every 8 hours PRN Nausea and/or Vomiting    ASSESSMENT AND PLAN:    78y Female with prior history of subclinical hypothyroidism, CKD, Henoch Schonlein purpura in 1/23 s/p course of steroids with resolution, chronic LE edema on Bumex, Depression, HLD, Insomnia who was sent in from Pulmonary office after presenting there with c/p progressive VALENZUELA.    ECG with no acute changes. Will check Echo to assess LV function  No gross volume overload at this time. She is no longer on steroid therapy  Has declined stress testing in the past.   Continue statin                                                [facility], [facility]                              [address] [place] [address]                                                 PHONE: [phone]                                                 FAX: [phone]  ------------------------------------------------------------------------------------------------    Chief complaint: VALENZUELA, shortness of breath    78y Female with past medical history as under who was sent in from pulmonary office after presenting there with c/p progressive VALENZUELA to the point were she cant walk more than 10-15 feet without feeling SOB. As per pt, no hypoxia noted at Pulmonary office.   As per chart, she reported chest tightness/heaviness with the VALENZUELA as well as dizziness which both get better when she stops and rests although pt did not report that to me.   In the ED vitals stable, no hypoxia noted at rest or with exertion. No evidence of ACS, PNA/CHF or anemia. Age adjusted ddimer and ESR kamari.. CTH negative, EKG non-ischemic.  At the time of evaluation, pt reports feeling better. She reports no chest pain. She follows with Dr. Galindo    PAST MEDICAL & SURGICAL HISTORY:  Hypertension      Kidney stone      Hypothyroidism      Depressed      Mycosis fungoides      S/P hysterectomy          codeine (Faint)  penicillin (Unknown)      Vital Signs Last 24 Hrs  T(C): 36.6 (31 May 2023 07:54), Max: 37.1 (30 May 2023 21:48)  T(F): 97.8 (31 May 2023 07:54), Max: 98.8 (30 May 2023 23:20)  HR: 70 (31 May 2023 07:54) (68 - 79)  BP: 134/70 (31 May 2023 07:54) (118/62 - 160/80)  BP(mean): 105 (30 May 2023 22:52) (105 - 105)  RR: 18 (31 May 2023 07:54) (18 - 20)  SpO2: 96% (31 May 2023 07:54) (95% - 98%)    Parameters below as of 31 May 2023 07:54  Patient On (Oxygen Delivery Method): room air        RELEVENT PHYSICAL EXAM:  Cardiovascular: regular S1, S2  Respiratory: Lungs clear to auscultation; no crepitations, no wheeze  Musculoskeletal: No edema    LABS:                        11.2   5.61  )-----------( 160      ( 31 May 2023 02:20 )             35.4     05-31    141  |  106  |  21.1<H>  ----------------------------<  148<H>  3.2<L>   |  23.0  |  1.39<H>    Ca    8.6      31 May 2023 02:20  Phos  2.6     05-31  Mg     2.2     05-31    TPro  6.8  /  Alb  3.9  /  TBili  0.3<L>  /  DBili  x   /  AST  50<H>  /  ALT  33<H>  /  AlkPhos  132<H>  05-30    CARDIAC MARKERS ( 31 May 2023 02:20 )  x     / x     / 63 U/L / x     / x      CARDIAC MARKERS ( 30 May 2023 18:17 )  x     / <0.01 ng/mL / x     / x     / x              RADIOLOGY & ADDITIONAL STUDIES: (reviewed)  CXR was independently visualized/reviewed and demonstrated: clear lungs    CARDIOLOGY TESTING:(reviewed)     ECG (Independent visualization): NSR     TELEMETRY independently visualized/reviewed and demonstrated : NSR 60-80    MEDICATIONS:(reviewed)  Home Medications:  Home Medications:  acetaminophen 325 mg oral tablet: 2 tab(s) orally every 6 hours, As needed, Mild Pain (06 Feb 2015 17:32)  bumetanide 0.5 mg oral tablet: 1 tab(s) orally once a day (30 May 2023 23:06)  Colace sodium 100 mg oral capsule: 1 cap(s) orally 2 times a day  (11 Feb 2015 10:40)  dicyclomine 10 mg oral capsule: 2 orally once a day (30 May 2023 23:06)  FLUoxetine 10 mg oral capsule: 1 cap(s) orally once a day (06 Feb 2015 17:32)  Lipitor 40 mg oral tablet: 1 orally once a day (at bedtime) (30 May 2023 23:06)  multivitamin: 1 tab(s) orally once a day (11 Feb 2015 10:40)  Neurontin 300 mg oral capsule: 1 tab(s) orally 2 times a day (30 May 2023 23:06)      MEDICATIONS  (STANDING):  atorvastatin 40 milliGRAM(s) Oral at bedtime  dicyclomine 20 milliGRAM(s) Oral daily  FLUoxetine 10 milliGRAM(s) Oral daily  folic acid 1 milliGRAM(s) Oral daily  gabapentin 300 milliGRAM(s) Oral two times a day  guaiFENesin  milliGRAM(s) Oral every 12 hours  heparin   Injectable 5000 Unit(s) SubCutaneous every 12 hours  potassium chloride   Powder 40 milliEquivalent(s) Oral once  sodium chloride 0.9% lock flush 3 milliLiter(s) IV Push every 8 hours  traZODone 100 milliGRAM(s) Oral at bedtime    MEDICATIONS  (PRN):  acetaminophen     Tablet .. 650 milliGRAM(s) Oral every 6 hours PRN Temp greater or equal to 38C (100.4F), Mild Pain (1 - 3)  aluminum hydroxide/magnesium hydroxide/simethicone Suspension 30 milliLiter(s) Oral every 4 hours PRN Dyspepsia  melatonin 3 milliGRAM(s) Oral at bedtime PRN Insomnia  ondansetron Injectable 4 milliGRAM(s) IV Push every 8 hours PRN Nausea and/or Vomiting    ASSESSMENT AND PLAN:    78y Female with prior history of subclinical hypothyroidism, CKD, Henoch Schonlein purpura in 1/23 s/p course of steroids with resolution, chronic LE edema on Bumex, Depression, HLD, Insomnia who was sent in from Pulmonary office after presenting there with c/p progressive VALENZUELA.    ECG with no acute changes. Will check Echo to assess LV function  No gross volume overload at this time. She is no longer on steroid therapy  Has declined stress testing in the past. Plan for R and LHC in AM to assess for CAD, pHTN and wedge pressures  Continue statin    d/w Dr. Santillan and Dr. Daniel Brothers [son] by phone

## 2023-05-31 NOTE — PROGRESS NOTE ADULT - SUBJECTIVE AND OBJECTIVE BOX
JANINE RONQUILLO    [id number]    78y      Female    INTERVAL HPI/OVERNIGHT EVENTS: patient being seen for weakness, sob and rose. Patient seen at bedside and states feeling ok.     patient is for tte and mri head      REVIEW OF SYSTEMS:    CONSTITUTIONAL: No fever, weight loss, or fatigue  RESPIRATORY: No cough, wheezing, hemoptysis; No shortness of breath  CARDIOVASCULAR: No chest pain, palpitations  GASTROINTESTINAL: No abdominal or epigastric pain. No nausea, vomiting  NEUROLOGICAL: No headaches, memory loss, loss of strength.  MISCELLANEOUS:      Vital Signs Last 24 Hrs  T(C): 36.6 (31 May 2023 07:54), Max: 37.1 (30 May 2023 21:48)  T(F): 97.8 (31 May 2023 07:54), Max: 98.8 (30 May 2023 23:20)  HR: 70 (31 May 2023 07:54) (68 - 79)  BP: 134/70 (31 May 2023 07:54) (118/62 - 160/80)  BP(mean): 105 (30 May 2023 22:52) (105 - 105)  RR: 18 (31 May 2023 07:54) (18 - 20)  SpO2: 96% (31 May 2023 07:54) (95% - 98%)    Parameters below as of 31 May 2023 07:54  Patient On (Oxygen Delivery Method): room air        PHYSICAL EXAM:  · Constitutional	no distress  · Constitutional Comments	elderly female sitting up in bed in NAD  · Eyes	conjunctiva clear  · Respiratory	no wheezes; no rales; no rhonchi  · Cardiovascular	regular rate and rhythm; S1 S2 present; no gallops  · Gastrointestinal	soft; nontender; nondistended  · Mental Status	AAOX3  · Cranial Nerve	2-12 grossly intact  · Motor	5/5 x 4  · Sensory	intact to light touch  · Skin	warm and dry  · Skin Comments	no purpura or petechia, warm and dry  · Musculoskeletal	no joint swelling  · Psychiatric	normal affect      LABS:                        11.2   5.61  )-----------( 160      ( 31 May 2023 02:20 )             35.4         141  |  106  |  21.1<H>  ----------------------------<  148<H>  3.2<L>   |  23.0  |  1.39<H>    Ca    8.6      31 May 2023 02:20  Phos  2.6     -  Mg     2.2     -    TPro  6.8  /  Alb  3.9  /  TBili  0.3<L>  /  DBili  x   /  AST  50<H>  /  ALT  33<H>  /  AlkPhos  132<H>  05      Urinalysis Basic - ( 31 May 2023 03:00 )    Color: Yellow / Appearance: Clear / S.015 / pH: x  Gluc: x / Ketone: Negative  / Bili: Negative / Urobili: Negative mg/dL   Blood: x / Protein: 15 / Nitrite: Negative   Leuk Esterase: Negative / RBC: 3-5 /HPF / WBC 0-2 /HPF   Sq Epi: x / Non Sq Epi: x / Bacteria: Occasional          MEDICATIONS  (STANDING):  atorvastatin 40 milliGRAM(s) Oral at bedtime  dicyclomine 20 milliGRAM(s) Oral daily  FLUoxetine 10 milliGRAM(s) Oral daily  folic acid 1 milliGRAM(s) Oral daily  gabapentin 300 milliGRAM(s) Oral two times a day  guaiFENesin  milliGRAM(s) Oral every 12 hours  heparin   Injectable 5000 Unit(s) SubCutaneous every 12 hours  levothyroxine 25 MICROGram(s) Oral daily  sodium chloride 0.9% lock flush 3 milliLiter(s) IV Push every 8 hours  traZODone 100 milliGRAM(s) Oral at bedtime    MEDICATIONS  (PRN):  acetaminophen     Tablet .. 650 milliGRAM(s) Oral every 6 hours PRN Temp greater or equal to 38C (100.4F), Mild Pain (1 - 3)  aluminum hydroxide/magnesium hydroxide/simethicone Suspension 30 milliLiter(s) Oral every 4 hours PRN Dyspepsia  melatonin 3 milliGRAM(s) Oral at bedtime PRN Insomnia  ondansetron Injectable 4 milliGRAM(s) IV Push every 8 hours PRN Nausea and/or Vomiting      RADIOLOGY & ADDITIONAL TESTS:

## 2023-05-31 NOTE — CONSULT NOTE ADULT - REASON FOR ADMISSION
VALENZUELA  Intermittent dizziness

## 2023-05-31 NOTE — PROGRESS NOTE ADULT - ASSESSMENT
79 y/o female with VALENZUELA/chest tightness, mild LFT elevation Hx of  CKD-4, HLD, subclincal hypothyroidism, Chronic LE edema, HSP, Insomnia, depression    VALENZUELA/Exertional CP:  -Concern for exertional Angina based on non-diagnostic w/u thus far  -2D echo pending   -spoke to cardio for cath in am   - for mri head    CKD-4:  -Avoid nephrotoxins  -Hold bumex  -cr improved  -consulted Dr Ricardo     HLD:  -Statin    Chronic LE edema:  -No hx of VTE, no calf pain  -Prior LE dopplers reported as neg  -Lymphedema? Pulm HTN?    Sub-clinical hypothyroidism:  -TSH 9  -start synthroid  - son thinks she hasnt been taking her meds    HSP:  -Dx in 1/23, s/p steroids  -No new lesions noted  -ESR age adjusted is normal    Insomnia:  -Cont. Trazodone     Depression:  -Denies SI  -Cont. Fluoxetine    Mild LFT elevation:  -Statin induced?  -Hepatitis profile   -RUQ sono neg    spoke to Son Dr Brothers at [facility]

## 2023-05-31 NOTE — CONSULT NOTE ADULT - SUBJECTIVE AND OBJECTIVE BOX
Patient is a 78y old  Female who presents with a chief complaint of VALENZUELA  Intermittent dizziness (31 May 2023 14:24)   Acute  renal failure.    HPI:  79 y/o female with hx of subclinical hypothyroidism, CKD, Henoch Schonlein purpura in  s/p course of steroids with resolution, chronic LE edema on Bumex, Depression, HLD, Insomnia who was sent in from Pulmonary office after presenting there with c/p progressive VALENZUELA to the point were she cant walk more than 10-15 feet without feeling SOB. No hypoxia noted during todays exam at Pulmonary office. She admits to chest tightness/heaviness with the VALENZUELA as well as dizziness which both get better when she stops and rests. Denies any recent infections, travel, sick contacts, or changes in her mediations. She denies any other neurological complaints, no falls and no hx of VTE/CAD/CVA. She currently lives alone using a walker intermittently. Denies any /GI complaints, and her weight has not changed this year. In the ED vitals stable, no hypoxia noted at rest or with exertion. No evidence of ACS, PNA/CHF or anemia. Age adjusted ddimer and ESR kamari.. CTH negative, EKG non-ischemic. Patient with reproducible exertional dyspnea. Patient seen with Son, who is ED Physician at bedside.  (30 May 2023 22:52)   Hx renal stones x1 not aware of type, no other renal  problems; elevated creatinine on admission.    PAST MEDICAL & SURGICAL HISTORY:  Hypertension      Kidney stone      Hypothyroidism      Depressed      Mycosis fungoides      S/P hysterectomy          FAMILY HISTORY:  Family history of gastric cancer (Sibling)    NC    Social History:Non smoker    MEDICATIONS  (STANDING):  atorvastatin 40 milliGRAM(s) Oral at bedtime  dicyclomine 20 milliGRAM(s) Oral daily  FLUoxetine 10 milliGRAM(s) Oral daily  folic acid 1 milliGRAM(s) Oral daily  gabapentin 300 milliGRAM(s) Oral two times a day  guaiFENesin  milliGRAM(s) Oral every 12 hours  heparin   Injectable 5000 Unit(s) SubCutaneous every 12 hours  levothyroxine 25 MICROGram(s) Oral daily  sodium chloride 0.9% lock flush 3 milliLiter(s) IV Push every 8 hours  traZODone 100 milliGRAM(s) Oral at bedtime    MEDICATIONS  (PRN):  acetaminophen     Tablet .. 650 milliGRAM(s) Oral every 6 hours PRN Temp greater or equal to 38C (100.4F), Mild Pain (1 - 3)  aluminum hydroxide/magnesium hydroxide/simethicone Suspension 30 milliLiter(s) Oral every 4 hours PRN Dyspepsia  melatonin 3 milliGRAM(s) Oral at bedtime PRN Insomnia  ondansetron Injectable 4 milliGRAM(s) IV Push every 8 hours PRN Nausea and/or Vomiting   Meds reviewed    Allergies    codeine (Faint)  penicillin (Unknown)      REVIEW OF SYSTEMS:    CONSTITUTIONAL:  sobe, hears  wheezes  at home  EYES: No eye pain, visual disturbances, or discharge  ENMT:  No difficulty hearing, tinnitus, vertigo; No sinus or throat pain  NECK: No pain or stiffness  BREASTS: No pain, masses, or nipple discharge  RESPIRATORY: sobe  CARDIOVASCULAR: No chest pain, palpitations, dizziness,   GASTROINTESTINAL: No abdominal or epigastric pain. No nausea, vomiting, or hematemesis; No diarrhea or constipation. No melena or hematochezia.Trunckal obesity  GENITOURINARY: No dysuria, frequency, hematuria, or incontinence  NEUROLOGICAL: No headaches, memory loss, loss of strength, numbness, or tremors Admits to balance issues  SKIN: Neg  LYMPH NODES: No enlarged glands  ENDOCRINE: No heat or cold intolerance; No hair loss  MUSCULOSKELETAL: Chronic lymphedema legs with scars  PSYCHIATRIC: + depression  HEME/LYMPH: No easy bruising, or bleeding gums  ALLERY AND IMMUNOLOGIC: No hives or eczema      Vital Signs Last 24 Hrs  T(C): 36.6 (31 May 2023 12:59), Max: 37.1 (30 May 2023 21:48)  T(F): 97.8 (31 May 2023 12:59), Max: 98.8 (30 May 2023 23:20)  HR: 55 (31 May 2023 12:59) (55 - 79)  BP: 142/73 (31 May 2023 12:59) (118/62 - 160/80)  BP(mean): 105 (30 May 2023 22:52) (105 - 105)  RR: 18 (31 May 2023 12:59) (18 - 20)  SpO2: 98% (31 May 2023 12:59) (95% - 98%)    Parameters below as of 31 May 2023 12:59  Patient On (Oxygen Delivery Method): room air      Daily Height in cm: 162.56 (31 May 2023 12:59)    Daily     PHYSICAL EXAM:    GENERAL: appears chronically ill, oriented.  HEAD:  Atraumatic, Normocephalic  EYES: EOMI, PERRLA, conjunctiva and sclera clear  ENMT: No tonsillar erythema, exudates, or enlargement; Moist mucous membranes, Good dentition, No lesions  NECK: Supple, neck  veins wnl  NERVOUS SYSTEM:  Alert & Oriented X3, Good concentration; Motor Strength wnl upper and lower extremities;  CHEST/LUNG: Clear to percussion bilaterally; No rales, rhonchi, wheezing, or rubs, no 02  HEART: Regular rate and rhythm; No rub  ABDOMEN: Soft, Nontender, Nondistended; Bowel sounds present, pt  eating  EXTREMITIES:  No edema  : neg  SKIN: No rashes or lesions, pale      LABS:                        11.2   5.61  )-----------( 160      ( 31 May 2023 02:20 )             35.4         141  |  106  |  21.1<H>  ----------------------------<  148<H>  3.2<L>   |  23.0  |  1.39<H>    Ca    8.6      31 May 2023 02:20  Phos  2.6       Mg     2.2         TPro  6.8  /  Alb  3.9  /  TBili  0.3<L>  /  DBili  x   /  AST  50<H>  /  ALT  33<H>  /  AlkPhos  132<H>        Urinalysis Basic - ( 31 May 2023 03:00 )    Color: Yellow / Appearance: Clear / S.015 / pH: x  Gluc: x / Ketone: Negative  / Bili: Negative / Urobili: Negative mg/dL   Blood: x / Protein: 15 / Nitrite: Negative   Leuk Esterase: Negative / RBC: 3-5 /HPF / WBC 0-2 /HPF   Sq Epi: x / Non Sq Epi: x / Bacteria: Occasional      Magnesium, Serum: 2.2 mg/dL ( @ 02:20)  Phosphorus Level, Serum: 2.6 mg/dL ( @ 02:20)    ABG - ( 30 May 2023 19:44 )  pH, Arterial: 7.450 pH, Blood: x     /  pCO2: 36    /  pO2: 76    / HCO3: 25    / Base Excess: 1.0   /  SaO2: 97.4                  RADIOLOGY & ADDITIONAL TESTS:

## 2023-05-31 NOTE — CONSULT NOTE ADULT - SUBJECTIVE AND OBJECTIVE BOX
Neurology consult    JANINE RONQUILLO 78y Female     Patient is a 78y old  Female who presents with a chief complaint of VALENZUELA  Intermittent dizziness (31 May 2023 10:32)      HPI:  77 y/o female with hx of subclinical hypothyroidism, CKD, Henoch Schonlein purpura in  s/p course of steroids with resolution, chronic LE edema on Bumex, Depression, HLD, Insomnia who was sent in from Pulmonary office after presenting there with c/p progressive VALENZUELA to the point were she cant walk more than 10-15 feet without feeling SOB. No hypoxia noted during todays exam at Pulmonary office. She admits to chest tightness/heaviness with the VALENZUELA as well as dizziness which both get better when she stops and rests. Denies any recent infections, travel, sick contacts, or changes in her mediations. She denies any other neurological complaints, no falls and no hx of VTE/CAD/CVA. She currently lives alone using a walker intermittently. Denies any /GI complaints, and her weight has not changed this year. In the ED vitals stable, no hypoxia noted at rest or with exertion. No evidence of ACS, PNA/CHF or anemia. Age adjusted ddimer and ESR kamari.. CTH negative, EKG non-ischemic. Patient with reproducible exertional dyspnea. Patient seen with Son, who is ED Physician at bedside.  (30 May 2023 22:52)      REVIEW OF SYSTEMS:    Constitutional: No fever, chills, fatigue, weakness  Eyes: no eye pain, visual disturbances, or discharge  ENT:  No difficulty hearing, tinnitus, vertigo; No sinus or throat pain  Neck: No pain or stiffness  Respiratory: No cough, dyspnea, wheezing   Cardiovascular: No chest pain, palpitations,   Gastrointestinal: No abdominal or epigastric pain. No nausea, vomiting  No diarrhea or constipation.   Genitourinary: No dysuria, frequency, hematuria or incontinence  Neurological: No headaches, lightheadedness, vertigo, numbness or tremors  Psychiatric: No depression, anxiety, mood swings or difficulty sleeping  Musculoskeletal: No joint pain or swelling; No muscle, back or extremity pain  Skin: No itching, burning, rashes or lesions   Lymph Nodes: No enlarged glands  Endocrine: No heat or cold intolerance; No hair loss, No h/o diabetes or thyroid dysfunction  Allergy and Immunologic: No hives or eczema      PMH: Hypertension    Kidney stone    Hypothyroidism    Depressed    Mycosis fungoides         PSH: S/P hysterectomy          FAMILY HISTORY:  Family history of gastric cancer (Sibling)        SOCIAL HISTORY:  No history of tobacco or alcohol use     Allergies    codeine (Faint)  penicillin (Unknown)    Intolerances        Height (cm): 162.6 ( @ 12:59)  Weight (kg): 76.7 ( @ 12:59)  BMI (kg/m2): 29 ( @ 12:59)    Vital Signs Last 24 Hrs  T(C): 36.6 (31 May 2023 12:59), Max: 37.1 (30 May 2023 21:48)  T(F): 97.8 (31 May 2023 12:59), Max: 98.8 (30 May 2023 23:20)  HR: 55 (31 May 2023 12:59) (55 - 79)  BP: 142/73 (31 May 2023 12:59) (118/62 - 160/80)  BP(mean): 105 (30 May 2023 22:52) (105 - 105)  RR: 18 (31 May 2023 12:59) (18 - 20)  SpO2: 98% (31 May 2023 12:59) (95% - 98%)    Parameters below as of 31 May 2023 12:59  Patient On (Oxygen Delivery Method): room air      MEDICATIONS    acetaminophen     Tablet .. 650 milliGRAM(s) Oral every 6 hours PRN  aluminum hydroxide/magnesium hydroxide/simethicone Suspension 30 milliLiter(s) Oral every 4 hours PRN  atorvastatin 40 milliGRAM(s) Oral at bedtime  dicyclomine 20 milliGRAM(s) Oral daily  FLUoxetine 10 milliGRAM(s) Oral daily  folic acid 1 milliGRAM(s) Oral daily  gabapentin 300 milliGRAM(s) Oral two times a day  guaiFENesin  milliGRAM(s) Oral every 12 hours  heparin   Injectable 5000 Unit(s) SubCutaneous every 12 hours  levothyroxine 25 MICROGram(s) Oral daily  melatonin 3 milliGRAM(s) Oral at bedtime PRN  ondansetron Injectable 4 milliGRAM(s) IV Push every 8 hours PRN  sodium chloride 0.9% lock flush 3 milliLiter(s) IV Push every 8 hours  traZODone 100 milliGRAM(s) Oral at bedtime        LABS:  CBC Full  -  ( 31 May 2023 02:20 )  WBC Count : 5.61 K/uL  RBC Count : 3.88 M/uL  Hemoglobin : 11.2 g/dL  Hematocrit : 35.4 %  Platelet Count - Automated : 160 K/uL  Mean Cell Volume : 91.2 fl  Mean Cell Hemoglobin : 28.9 pg  Mean Cell Hemoglobin Concentration : 31.6 gm/dL  Auto Neutrophil # : x  Auto Lymphocyte # : x  Auto Monocyte # : x  Auto Eosinophil # : x  Auto Basophil # : x  Auto Neutrophil % : x  Auto Lymphocyte % : x  Auto Monocyte % : x  Auto Eosinophil % : x  Auto Basophil % : x    Urinalysis Basic - ( 31 May 2023 03:00 )    Color: Yellow / Appearance: Clear / S.015 / pH: x  Gluc: x / Ketone: Negative  / Bili: Negative / Urobili: Negative mg/dL   Blood: x / Protein: 15 / Nitrite: Negative   Leuk Esterase: Negative / RBC: 3-5 /HPF / WBC 0-2 /HPF   Sq Epi: x / Non Sq Epi: x / Bacteria: Occasional          141  |  106  |  21.1<H>  ----------------------------<  148<H>  3.2<L>   |  23.0  |  1.39<H>    Ca    8.6      31 May 2023 02:20  Phos  2.6       Mg     2.2         TPro  6.8  /  Alb  3.9  /  TBili  0.3<L>  /  DBili  x   /  AST  50<H>  /  ALT  33<H>  /  AlkPhos  132<H>      LIVER FUNCTIONS - ( 30 May 2023 18:17 )  Alb: 3.9 g/dL / Pro: 6.8 g/dL / ALK PHOS: 132 U/L / ALT: 33 U/L / AST: 50 U/L / GGT: x           Hemoglobin A1C:     Vitamin B12, Serum: 666 pg/mL ( @ 18:17)          On Neurological Examination:    Mental Status - Patient is  awake, alert and oriented X3.  Speech is fluent. Patient can name, repeat and follow commands correctly  There is no dysarthria.    Cranial Nerves - PERRL, EOMI,  Visual fields are full to finger counting, no gross facial asymmetry, tongue/uvula midline    Motor Exam -   Right upper ---5/5 No drift  Left upper ---5/5 No drift  Right lower ---5/5 No drift  Left lower  ---5/ No drift     nml bulk/tone    Sensory    Intact to light touch and pinprick bilaterally    Coord: FTN intact bilaterally     Gait -  Not assessed.          RADIOLOGY ( All neurological imaging studies were independently reviewed and interpreted by me)  CTH     CT Head No Cont (23 @ 20:42) >  IMPRESSION:  No mass effect, hemorrhage or evidence of acute intracranial pathology.    MARIELLE ALVES MD; Attending Radiologist      CTA  CTP  MRI:  TTE

## 2023-05-31 NOTE — CONSULT NOTE ADULT - ASSESSMENT
A) ARF with Hx recent lower leg  vasculitis Rx  with prednisone until a couple  months  ago, proteinuria, microhematuria - ? AGN. hypokalemia  with  normal mag.    P) Sono, urine studies, serology. Follow up labs.
IMPRESSION:  - Intermittent dizziness.  - Exertional dyspnea.      ASSESSMENT/ PLAN:       - General Neuro checks and vital signs Q 4 hours.  - SBP goalkeep normotensive.  - Telemetry monitoring.  - CT Head -images and reports were reviewed.   - MRI Brain no contrast.  - TTE   - PT OT SLP evaluation.  - SCD/ SQ Heparin for DVT prophylaxis.    
No

## 2023-06-01 ENCOUNTER — TRANSCRIPTION ENCOUNTER (OUTPATIENT)
Age: 78
End: 2023-06-01

## 2023-06-01 DIAGNOSIS — I25.10 ATHEROSCLEROTIC HEART DISEASE OF NATIVE CORONARY ARTERY WITHOUT ANGINA PECTORIS: ICD-10-CM

## 2023-06-01 LAB
A1C WITH ESTIMATED AVERAGE GLUCOSE RESULT: 6.3 % — HIGH (ref 4–5.6)
ALBUMIN SERPL ELPH-MCNC: 3.4 G/DL — SIGNIFICANT CHANGE UP (ref 3.3–5.2)
ALP SERPL-CCNC: 111 U/L — SIGNIFICANT CHANGE UP (ref 40–120)
ALT FLD-CCNC: 23 U/L — SIGNIFICANT CHANGE UP
ANION GAP SERPL CALC-SCNC: 12 MMOL/L — SIGNIFICANT CHANGE UP (ref 5–17)
AST SERPL-CCNC: 24 U/L — SIGNIFICANT CHANGE UP
BILIRUB SERPL-MCNC: 0.2 MG/DL — LOW (ref 0.4–2)
BUN SERPL-MCNC: 20.5 MG/DL — HIGH (ref 8–20)
C3 SERPL-MCNC: 138 MG/DL — SIGNIFICANT CHANGE UP (ref 81–157)
C4 SERPL-MCNC: 33 MG/DL — SIGNIFICANT CHANGE UP (ref 13–39)
CALCIUM SERPL-MCNC: 8.2 MG/DL — LOW (ref 8.4–10.5)
CHLORIDE SERPL-SCNC: 107 MMOL/L — SIGNIFICANT CHANGE UP (ref 96–108)
CO2 SERPL-SCNC: 22 MMOL/L — SIGNIFICANT CHANGE UP (ref 22–29)
CREAT SERPL-MCNC: 1.17 MG/DL — SIGNIFICANT CHANGE UP (ref 0.5–1.3)
CULTURE RESULTS: SIGNIFICANT CHANGE UP
EGFR: 47 ML/MIN/1.73M2 — LOW
ESTIMATED AVERAGE GLUCOSE: 134 MG/DL — HIGH (ref 68–114)
GLUCOSE SERPL-MCNC: 112 MG/DL — HIGH (ref 70–99)
HCT VFR BLD CALC: 34.5 % — SIGNIFICANT CHANGE UP (ref 34.5–45)
HGB BLD-MCNC: 10.8 G/DL — LOW (ref 11.5–15.5)
MAGNESIUM SERPL-MCNC: 2.2 MG/DL — SIGNIFICANT CHANGE UP (ref 1.6–2.6)
MCHC RBC-ENTMCNC: 29 PG — SIGNIFICANT CHANGE UP (ref 27–34)
MCHC RBC-ENTMCNC: 31.3 GM/DL — LOW (ref 32–36)
MCV RBC AUTO: 92.7 FL — SIGNIFICANT CHANGE UP (ref 80–100)
PLATELET # BLD AUTO: 172 K/UL — SIGNIFICANT CHANGE UP (ref 150–400)
POTASSIUM SERPL-MCNC: 3.6 MMOL/L — SIGNIFICANT CHANGE UP (ref 3.5–5.3)
POTASSIUM SERPL-SCNC: 3.6 MMOL/L — SIGNIFICANT CHANGE UP (ref 3.5–5.3)
PROT SERPL-MCNC: 5.8 G/DL — LOW (ref 6.6–8.7)
RBC # BLD: 3.72 M/UL — LOW (ref 3.8–5.2)
RBC # FLD: 15 % — HIGH (ref 10.3–14.5)
SODIUM SERPL-SCNC: 141 MMOL/L — SIGNIFICANT CHANGE UP (ref 135–145)
SPECIMEN SOURCE: SIGNIFICANT CHANGE UP
TOTAL HEM COMP BLD-ACNC: 73 U/ML — SIGNIFICANT CHANGE UP (ref 42–95)
WBC # BLD: 5.34 K/UL — SIGNIFICANT CHANGE UP (ref 3.8–10.5)
WBC # FLD AUTO: 5.34 K/UL — SIGNIFICANT CHANGE UP (ref 3.8–10.5)

## 2023-06-01 PROCEDURE — 99233 SBSQ HOSP IP/OBS HIGH 50: CPT

## 2023-06-01 RX ORDER — ASPIRIN/CALCIUM CARB/MAGNESIUM 324 MG
81 TABLET ORAL ONCE
Refills: 0 | Status: COMPLETED | OUTPATIENT
Start: 2023-06-01 | End: 2023-06-01

## 2023-06-01 RX ORDER — SODIUM CHLORIDE 9 MG/ML
250 INJECTION INTRAMUSCULAR; INTRAVENOUS; SUBCUTANEOUS ONCE
Refills: 0 | Status: COMPLETED | OUTPATIENT
Start: 2023-06-01 | End: 2023-06-01

## 2023-06-01 RX ADMIN — Medication 1 MILLIGRAM(S): at 11:43

## 2023-06-01 RX ADMIN — SODIUM CHLORIDE 3 MILLILITER(S): 9 INJECTION INTRAMUSCULAR; INTRAVENOUS; SUBCUTANEOUS at 10:59

## 2023-06-01 RX ADMIN — SODIUM CHLORIDE 3 MILLILITER(S): 9 INJECTION INTRAMUSCULAR; INTRAVENOUS; SUBCUTANEOUS at 05:44

## 2023-06-01 RX ADMIN — Medication 20 MILLIGRAM(S): at 11:43

## 2023-06-01 RX ADMIN — SODIUM CHLORIDE 3 MILLILITER(S): 9 INJECTION INTRAMUSCULAR; INTRAVENOUS; SUBCUTANEOUS at 21:13

## 2023-06-01 RX ADMIN — Medication 25 MICROGRAM(S): at 05:44

## 2023-06-01 RX ADMIN — SODIUM CHLORIDE 250 MILLILITER(S): 9 INJECTION INTRAMUSCULAR; INTRAVENOUS; SUBCUTANEOUS at 16:47

## 2023-06-01 RX ADMIN — HEPARIN SODIUM 5000 UNIT(S): 5000 INJECTION INTRAVENOUS; SUBCUTANEOUS at 05:44

## 2023-06-01 RX ADMIN — ATORVASTATIN CALCIUM 40 MILLIGRAM(S): 80 TABLET, FILM COATED ORAL at 21:35

## 2023-06-01 RX ADMIN — Medication 81 MILLIGRAM(S): at 16:47

## 2023-06-01 RX ADMIN — SODIUM CHLORIDE 250 MILLILITER(S): 9 INJECTION INTRAMUSCULAR; INTRAVENOUS; SUBCUTANEOUS at 21:41

## 2023-06-01 RX ADMIN — Medication 10 MILLIGRAM(S): at 11:43

## 2023-06-01 RX ADMIN — Medication 600 MILLIGRAM(S): at 05:44

## 2023-06-01 RX ADMIN — GABAPENTIN 300 MILLIGRAM(S): 400 CAPSULE ORAL at 05:44

## 2023-06-01 NOTE — DISCHARGE NOTE NURSING/CASE MANAGEMENT/SOCIAL WORK - NSDCPEFALRISK_GEN_ALL_CORE
For information on Fall & Injury Prevention, visit: [email or web address] OR  [email or web address] OR  https://www.cdc.gov/steadi/patient.html

## 2023-06-01 NOTE — PROGRESS NOTE ADULT - SUBJECTIVE AND OBJECTIVE BOX
Patient seen and examined  Feels better , more comfortable      REVIEW OF SYSTEMS:    CONSTITUTIONAL: No F/C  RESPIRATORY: No cough,  No SOB  CARDIOVASCULAR: No CP/palpitations,    GASTROINTESTINAL: No abdominal pain  or NVD   GENITOURINARY: No UTI sx  NEUROLOGICAL: No headaches, NO wk/numbness  MUSCULOSKELETAL:   EXTREMITIES : no swelling,Has faint rash    Vital Signs Last 24 Hrs  T(C): 36.7 (01 Jun 2023 11:39), Max: 36.7 (31 May 2023 22:37)  T(F): 98.1 (01 Jun 2023 11:39), Max: 98.1 (31 May 2023 22:37)  HR: 60 (01 Jun 2023 15:37) (60 - 72)  BP: 174/75 (01 Jun 2023 15:37) (125/70 - 174/75)  BP(mean): --  RR: 16 (01 Jun 2023 15:37) (16 - 18)  SpO2: 97% (01 Jun 2023 15:37) (94% - 97%)    Parameters below as of 01 Jun 2023 15:37  Patient On (Oxygen Delivery Method): room air        PHYSICAL EXAM:    GENERAL: NAD,   EYES:  conjunctiva and sclera clear  NECK: Supple, No JVD/Bruit  NERVOUS SYSTEM:  A/O x3,   CHEST:  No rales or rhonchi  HEART:  RRR, No murmur  ABDOMEN: Soft, NT/ND BS+  EXTREMITIES:  No Edema;  SKIN: Mild macular rashes over legs, no palpable lesions any longer - resolved    LABS:                          10.8   5.34  )-----------( 172      ( 01 Jun 2023 04:06 )             34.5     06-01    141  |  107  |  20.5<H>  ----------------------------<  112<H>  3.6   |  22.0  |  1.17    Ca    8.2<L>      01 Jun 2023 04:06  Phos  2.6     05-31  Mg     2.2     06-01    TPro  5.8<L>  /  Alb  3.4  /  TBili  0.2<L>  /  DBili  x   /  AST  24  /  ALT  23  /  AlkPhos  111  06-01      MEDICATIONS  (STANDING):  acetaminophen     Tablet .. PRN  aluminum hydroxide/magnesium hydroxide/simethicone Suspension PRN  atorvastatin  dicyclomine  FLUoxetine  folic acid  gabapentin  guaiFENesin ER  heparin   Injectable  levothyroxine  melatonin PRN  ondansetron Injectable PRN  sodium chloride 0.9% lock flush  traZODone

## 2023-06-01 NOTE — PROGRESS NOTE ADULT - NUTRITIONAL ASSESSMENT
ARF with Hx recent lower leg  vasculitis Rx  with prednisone until a couple  months  ago, proteinuria, microhematuria - ? AGN HSP - seen Derm  Repeat serologies sent  UA shows mod blood but c/s _ve  Denies any prior ERASTO/CKD - not aware- now creatinine normal  Sono normal  Had hypokalemia  with  normal mag - resolved now

## 2023-06-01 NOTE — DISCHARGE NOTE PROVIDER - NSDCCPCAREPLAN_GEN_ALL_CORE_FT
PRINCIPAL DISCHARGE DIAGNOSIS  Diagnosis: Dyspnea on exertion  Assessment and Plan of Treatment: Coronary artery disease is a condition where the arteries the supply the heart muscle get clogges with fatty deposits & puts you at risk for a heart attack  Call your doctor if you have any new pain, pressure, or discomfort in the center of your chest, pain, tingling or discomfort in arms, back, neck, jaw, or stomach, shortness of breath, nausea, vomiting, burping or heartburn, sweating, cold and clammy skin, racing or abnormal heartbeat for more than 10 minutes or if they keep coming & going.  Call 911 and do not tr to get to hospital by care  You can help yourself with lefestyle changes (quitting smoking if you smoke), eat lots of fruits & vegetables & low fat dairy products, not a lot of meat & fatty foods, walk or some form of physical activity most days of the week, lose weight if you are overweight  Take your cardiac medication as prescribed to lower cholesterol, to lower blood pressure, aspirin to prevent blood clots, and diabetes control  Make sure to keep appointments with doctor for cardiac follow up care        SECONDARY DISCHARGE DIAGNOSES  Diagnosis: Ataxia  Assessment and Plan of Treatment:

## 2023-06-01 NOTE — DISCHARGE NOTE NURSING/CASE MANAGEMENT/SOCIAL WORK - PATIENT PORTAL LINK FT
You can access the FollowMyHealth Patient Portal offered by [facility] by registering at the following website: [email or web address]. By joining [facility]’s FollowMyHealth portal, you will also be able to view your health information using other applications (apps) compatible with our system.

## 2023-06-01 NOTE — DISCHARGE NOTE PROVIDER - NSDCMRMEDTOKEN_GEN_ALL_CORE_FT
acetaminophen 325 mg oral tablet: 2 tab(s) orally every 6 hours, As needed, Mild Pain  bumetanide 0.5 mg oral tablet: 1 tab(s) orally once a day  Colace sodium 100 mg oral capsule: 1 cap(s) orally 2 times a day   dicyclomine 10 mg oral capsule: 2 orally once a day  FLUoxetine 10 mg oral capsule: 1 cap(s) orally once a day  Lipitor 40 mg oral tablet: 1 orally once a day (at bedtime)  multivitamin: 1 tab(s) orally once a day  Neurontin 300 mg oral capsule: 1 tab(s) orally 2 times a day   acetaminophen 325 mg oral tablet: 2 tab(s) orally every 6 hours, As needed, Mild Pain  Colace sodium 100 mg oral capsule: 1 cap(s) orally 2 times a day   dicyclomine 10 mg oral capsule: 2 orally once a day  FLUoxetine 10 mg oral capsule: 1 cap(s) orally once a day  levothyroxine 25 mcg (0.025 mg) oral tablet: 1 tab(s) orally once a day  Lipitor 40 mg oral tablet: 1 orally once a day (at bedtime)  melatonin 3 mg oral tablet: 1 tab(s) orally once a day (at bedtime) As needed Insomnia  multivitamin: 1 tab(s) orally once a day  Neurontin 300 mg oral capsule: 1 tab(s) orally 2 times a day  traZODone 100 mg oral tablet: 1 tab(s) orally once a day (at bedtime)

## 2023-06-01 NOTE — PROGRESS NOTE ADULT - ASSESSMENT
77 y/o female with VALENZUELA/chest tightness, mild LFT elevation Hx of  CKD-4, HLD, subclincal hypothyroidism, Chronic LE edema, HSP, Insomnia, depression    VALENZUELA/Exertional CP:  -2D echo - EF 6-%  -Cath + mild CAD  - for mri head    ARF: ? ARF on CKD    lower leg  vasculitis Rx  with prednisone recent, proteinuria, microhematuria  Sonogram is norm  Repeat serologies sent  UA shows mod blood but urine culture negative   -Avoid nephrotoxins  -Hold Bumex  -cr improved  -consulted Dr Ricardo     HLD:  -Statin    Chronic LE edema:  -No hx of VTE, no calf pain  -Prior LE dopplers reported as neg  -Lymphedema? Pulm HTN?    Sub-clinical hypothyroidism:  -TSH 9  -start synthroid      HSP:  -Dx in 1/23, s/p steroids  -No new lesions noted  -ESR age adjusted is normal    Insomnia:  -Cont. Trazodone     Depression:  -Denies SI  -Cont. Fluoxetine    Mild LFT elevation:  -Statin induced?  -Hepatitis profile   -RUQ sono neg

## 2023-06-01 NOTE — DISCHARGE NOTE PROVIDER - HOSPITAL COURSE
79 y/o female with hx of subclinical hypothyroidism, CKD, Henoch Schonlein purpura in 1/23 s/p course of steroids with resolution, chronic LE edema on Bumex, Depression, HLD, Insomnia was sent to ED on 05/30/23, from Pulmonary office after presenting there with c/p progressive VALENZUELA and  chest tightness/heaviness, and  dizziness. Concern for ischemia.  TTE results on 05/31/23 consistent with Grade 1 diastolic dysfunction, EF is 60%,   Patient presents to CCL for R/LHC to evaluate for CAD and to assess her right heart pressures.  ****************************         79 y/o female with hx of subclinical hypothyroidism, CKD, Henoch Schonlein purpura in 1/23 s/p course of steroids with resolution, chronic LE edema on Bumex, Depression, HLD, Insomnia was sent to ED on 05/30/23, from Pulmonary office after presenting there with c/p progressive VALENZUELA and  chest tightness/heaviness, and  dizziness. Concern for ischemia.  TTE results on 05/31/23 consistent with Grade 1 diastolic dysfunction, EF is 60%,     C/O  spilled some warm hot tea early this morning on her arm now better used ice pack   79 y/o female with VALENZUELA/chest tightness, mild LFT elevation Hx of  CKD-4, HLD, subclincal hypothyroidism, Chronic LE edema, HSP, Insomnia, depression    VALENZUELA/Exertional CP:  -2D echo - EF 6-%  -Cath + mild CAD- medical management      ARF: resolved- normal renal function now   Sonogram is norm  UA shows mod blood but urine culture negative   -Avoid nephrotoxins  -Hold Bumex  Dr Ricardo renal       HLD:  -Statin    Chronic LE edema:  -No hx of VTE, no calf pain  -Prior LE dopplers reported as neg  -Lymphedema? Pulm HTN?    Sub-clinical hypothyroidism:  -TSH 9  -start synthroid      HSP:  -Dx in 1/23, s/p steroids  -No new lesions noted  -ESR age adjusted is normal    Insomnia:  -Cont. Trazodone     Depression:  -Denies SI  -Cont. Fluoxetine    Mild LFT elevation:  -Statin induced?  -Hepatitis profile   -RUQ sono neg

## 2023-06-01 NOTE — PROGRESS NOTE ADULT - ASSESSMENT
A) ARF with Hx recent lower leg  vasculitis Rx  with prednisone until a couple  months  ago, proteinuria, microhematuria - ? AGN HSP - seen Derm  Repeat serologies sent  UA shows mod blood but c/s _ve  Denies any prior ERASTO/CKD - not aware- now creatinine normal  Sono normal  Had hypokalemia  with  normal mag - resolved now    HANS - cardiol following - refused cath prev    Dz - Neuro following    Muld anemia - watch  Labs am

## 2023-06-01 NOTE — PROGRESS NOTE ADULT - SUBJECTIVE AND OBJECTIVE BOX
JANINE RONQUILLO Patient is a 78y old  Female who presents with a chief complaint of VALENZUELA  Intermittent dizziness (2023 17:33)     HPI:  79 y/o female with hx of subclinical hypothyroidism, CKD, Henoch Schonlein purpura in  s/p course of steroids with resolution, chronic LE edema on Bumex, Depression, HLD, Insomnia who was sent in from Pulmonary office after presenting there with c/p progressive VALENZUELA to the point were she cant walk more than 10-15 feet without feeling SOB. No hypoxia noted during todays exam at Pulmonary office. She admits to chest tightness/heaviness with the VALENZUELA as well as dizziness which both get better when she stops and rests. Denies any recent infections, travel, sick contacts, or changes in her mediations. She denies any other neurological complaints, no falls and no hx of VTE/CAD/CVA. She currently lives alone using a walker intermittently. Denies any /GI complaints, and her weight has not changed this year. In the ED vitals stable, no hypoxia noted at rest or with exertion. No evidence of ACS, PNA/CHF or anemia. Age adjusted ddimer and ESR kamari.. CTH negative, EKG non-ischemic. Patient with reproducible exertional dyspnea. Patient seen with Son, who is ED Physician at bedside.  (30 May 2023 22:52)    The patient was seen and evaluated   The patient is in no acute distress.  Denied any fever abdominal pain, fever, dysuria, cough, edema       I&O's Summary    Allergies    codeine (Faint)  penicillin (Unknown)    Intolerances      HEALTH ISSUES - PROBLEM Dx:  Mild CAD  Coronary artery disease is a condition where the arteries the supply the heart muscle get clogges with fatty deposits & puts you at risk for a heart attack  Call your doctor if you have any new pain, pressure, or discomfort in the center of your chest, pain, tingling or discomfort in arms, back, neck, jaw, or stomach, shortness of breath, nausea, vomiting, burping or heartburn, sweating, cold and clammy skin, racing or abnormal heartbeat for more than 10 minutes or if they keep coming & going.  Call 911 and do not tr to get to hospital by care  You can help yourself with lefestyle changes (quitting smoking if you smoke), eat lots of fruits & vegetables & low fat dairy products, not a lot of meat & fatty foods, walk or some form of physical activity most days of the week, lose weight if you are overweight  Take your cardiac medication as prescribed to lower cholesterol, to lower blood pressure, aspirin to prevent blood clots, and diabetes control  Make sure to keep appointments with doctor for cardiac follow up care            PAST MEDICAL & SURGICAL HISTORY:  Hypertension      Kidney stone      Hypothyroidism      Depressed      Mycosis fungoides      S/P hysterectomy              Vital Signs Last 24 Hrs  T(C): 36.7 (2023 11:39), Max: 36.7 (2023 04:42)  T(F): 98.1 (2023 11:39), Max: 98.1 (2023 04:42)  HR: 63 (2023 22:00) (59 - 72)  BP: 146/68 (2023 22:00) (127/65 - 174/75)  BP(mean): --  RR: 18 (:00) (16 - 18)  SpO2: 99% (:00) (94% - 100%)    Parameters below as of :00  Patient On (Oxygen Delivery Method): room air    T(C): 36.7 (23 @ 11:39), Max: 36.7 (23 @ 04:42)  HR: 63 (23 @ 22:00) (59 - 72)  BP: 146/68 (23 @ 22:00) (127/65 - 174/75)  RR: 18 (23 @ 22:00) (16 - 18)  SpO2: 99% (23 @ 22:00) (94% - 100%)  Wt(kg): --    PHYSICAL EXAM:    GENERAL: NAD  HEAD:  Atraumatic, Normocephalic  EYES: EOMI, PERRL, conjunctiva and sclera clear  ENMT:  Moist mucous membranes,  No lesions  NECK: Supple, No JVD, Normal thyroid  NERVOUS SYSTEM:  Alert & Oriented X3,  Moves upper and lower extremities; CNS-II-XII  CHEST/LUNG: Clear to auscultation bilaterally; No rales, rhonchi, wheezing,   HEART: Regular rate and rhythm; No murmurs,   ABDOMEN: Soft, Nontender, Nondistended; Bowel sounds present  EXTREMITIES:  Peripheral Pulses, No  cyanosis, or edema  SKIN: No rashes or lesions  psychiatry- mood and affect appropriate, Insight and judgement intact     acetaminophen     Tablet .. 650 milliGRAM(s) Oral every 6 hours PRN  aluminum hydroxide/magnesium hydroxide/simethicone Suspension 30 milliLiter(s) Oral every 4 hours PRN  atorvastatin 40 milliGRAM(s) Oral at bedtime  dicyclomine 20 milliGRAM(s) Oral daily  FLUoxetine 10 milliGRAM(s) Oral daily  folic acid 1 milliGRAM(s) Oral daily  gabapentin 300 milliGRAM(s) Oral two times a day  guaiFENesin  milliGRAM(s) Oral every 12 hours  heparin   Injectable 5000 Unit(s) SubCutaneous every 12 hours  levothyroxine 25 MICROGram(s) Oral daily  melatonin 3 milliGRAM(s) Oral at bedtime PRN  ondansetron Injectable 4 milliGRAM(s) IV Push every 8 hours PRN  sodium chloride 0.9% lock flush 3 milliLiter(s) IV Push every 8 hours  traZODone 100 milliGRAM(s) Oral at bedtime      LABS:                          10.8   5.34  )-----------( 172      ( 2023 04:06 )             34.5     06-01    141  |  107  |  20.5<H>  ----------------------------<  112<H>  3.6   |  22.0  |  1.17    Ca    8.2<L>      2023 04:06  Phos  2.6     05-31  Mg     2.2     06-01    TPro  5.8<L>  /  Alb  3.4  /  TBili  0.2<L>  /  DBili  x   /  AST  24  /  ALT  23  /  AlkPhos  111  06-01    LIVER FUNCTIONS - ( 2023 04:06 )  Alb: 3.4 g/dL / Pro: 5.8 g/dL / ALK PHOS: 111 U/L / ALT: 23 U/L / AST: 24 U/L / GGT: x             CARDIAC MARKERS ( 31 May 2023 02:20 )  x     / x     / 63 U/L / x     / x          Urinalysis Basic - ( 31 May 2023 03:00 )    Color: Yellow / Appearance: Clear / S.015 / pH: x  Gluc: x / Ketone: Negative  / Bili: Negative / Urobili: Negative mg/dL   Blood: x / Protein: 15 / Nitrite: Negative   Leuk Esterase: Negative / RBC: 3-5 /HPF / WBC 0-2 /HPF   Sq Epi: x / Non Sq Epi: x / Bacteria: Occasional      CAPILLARY BLOOD GLUCOSE          RADIOLOGY & ADDITIONAL TESTS:      Consultant notes reviewed    Case discussed with consultant/provider/ family /patient

## 2023-06-01 NOTE — DISCHARGE NOTE PROVIDER - NSDCCPTREATMENT_GEN_ALL_CORE_FT
PRINCIPAL PROCEDURE  Procedure: Left heart catheterization  Findings and Treatment: No heavy lifting, driving, sex, tub baths, swimming, or any activity that submerges the lower half of the body in water for 48 hours.  Limited walking and stairs for 48 hours.    Change the bandaid after 24 hours and every 24 hours after that.  Keep the puncture site dry and covered with a bandaid until a scab forms.    Observe the site frequently.  If bleeding or a large lump (the size of a golf ball or bigger) occurs lie flat, apply continuous direct pressure just above the puncture site for at least 10 minutes, and notify your physician immediately.  If the bleeding cannot be controlled, call 911 immediately for assistance.  Notify your physician of pain, swelling or any drainage.    Notify your physician immediately if coldness, numbness, discoloration or pain in your foot occurs.        SECONDARY PROCEDURE  Procedure: Right heart cardiac catheterization  Findings and Treatment: No heavy lifting, driving, sex, tub baths, swimming, or any activity that submerges the lower half of the body in water for 48 hours.  Limited walking and stairs for 48 hours.    Change the bandaid after 24 hours and every 24 hours after that.  Keep the puncture site dry and covered with a bandaid until a scab forms.    Observe the site frequently.  If bleeding or a large lump (the size of a golf ball or bigger) occurs lie flat, apply continuous direct pressure just above the puncture site for at least 10 minutes, and notify your physician immediately.  If the bleeding cannot be controlled, call 911 immediately for assistance.  Notify your physician of pain, swelling or any drainage.    Notify your physician immediately if coldness, numbness, discoloration or pain in your foot occurs.

## 2023-06-01 NOTE — PROGRESS NOTE ADULT - ASSESSMENT
79 y/o female with hx of subclinical hypothyroidism, CKD, Henoch Schonlein purpura in 1/23 s/p course of steroids with resolution, chronic LE edema on Bumex, Depression, HLD, Insomnia was sent to ED on 05/30/23, from Pulmonary office after presenting there with c/p progressive VALENZUELA and  chest tightness/heaviness, and  dizziness. Concern for ischemia.  TTE results on 05/31/23 consistent with Grade 1 diastolic dysfunction, EF is 60%,   Patient now presents to CCL for R/LHC to evaluate for CAD and to assess her right heart pressures.    Risk Assessments:  ASA: 2  Mallampati:3  Creat: 1.17  GFR: 47  BRA: 4.7%    Indication: Anginal/ischemic symptoms,  Grade 1 diastolic dysfunction    Coronary Anatomy: Unknown    Plan:    -plan for R/LHC with possible intervention  -preferred access:   -confirmed appropriate NPO duration  -ECG and Labs reviewed  -Aspirin 81mg po pre-cath  -NS 0.9% 250ml/hr x 1 bolus; pre procedure QUOC ppx   -procedure discussed with patient; risks and benefits explained, questions answered  -consent obtained by attending DR Houser   79 y/o female with hx of subclinical hypothyroidism, CKD, Henoch Schonlein purpura in 1/23 s/p course of steroids with resolution, chronic LE edema on Bumex, Depression, HLD, Insomnia was sent to ED on 05/30/23, from Pulmonary office after presenting there with c/p progressive VALENZUELA and  chest tightness/heaviness, and  dizziness. Concern for ischemia.  TTE results on 05/31/23 consistent with Grade 1 diastolic dysfunction, EF is 60%,   Patient now presents to CCL for R/LHC to evaluate for CAD and to assess her right heart pressures.    Risk Assessments:  ASA: 2  Mallampati:3  Creat: 1.17  GFR: 47  BRA: 4.7%    Indication: Anginal/ischemic symptoms,  Grade 1 diastolic dysfunction    Coronary Anatomy: Unknown    Plan:    -plan for R/LHC with possible intervention  -preferred access:   -confirmed appropriate NPO duration  -ECG and Labs reviewed  -Aspirin 81mg po pre-cath  -NS 0.9% 250ml/hr x 1 bolus; pre procedure QUOC ppx   -procedure discussed with patient; risks and benefits explained, questions answered  -consent obtained by attending DR Houser    Interventional cardiology NP addendum note    Patient now s/p RHC and LHC with intervention via  RFV and RFA, s/p RFA myx, RFV # 7 Fr sheath in place, RLE warm, perfusing NV status intact, peripheral pulses 2+    intraprocedurally  Patient received IV Sedation    Omnipaque: 46ML      Cath findings    < from: Cardiac Catheterization (06.01.23 @ 19:08) >    2.    Arterial Access - Right Femoral   3.    Venous Access - Right Femoral   4.    RHC   5.    Diagnostic Coronary Angiography   6.   Left Heart Cath   7.    Mynx     LM   Left main artery: Angiography shows no disease.      LAD   Left anterior descending artery: Angiography shows minor  irregularities.    CX   Circumflex: Angiography shows minor irregularities.      RCA   Right coronary artery: Angiographyshows minor irregularities.      Diagnostic Conclusions:     Moderate pulmonary HTN.   Normal LV fxn.   Minimal CAD.     Recommendations:     Med tx     Right Heart Pressures:  RA: 16/16/15  RV: 39/6/11  PCW: 18/18/15  PA: 40/8/24      -ADMIT as patient already being inpatient  met major criteria of Anginal symptoms, CKD,  S/P DIAGNOSTIC R/LHC today   -post cardiac cath orders  -ns 250ml iv bolus x1 post cath hydration for QUOC prophylaxis  RlE precautions  -S/P RFA Mynx  -Remove RFV sheath  - bedrest while RFV sheath in place  -bedrest x 3 hours post RFV sheath removal until 11 pm, patient can sit up at 10 PM, OOB at 11 pm   -C/W Current management including Lipitor 40mg po HS  -follow up outpt in 2 weeks with [facility], Cardiologist Dr Houser  - Explained to the patient regarding weight reduction, Tight control of BM, LDL, cholesterol management, regular cardiology Follow ups, compliance with medical regimen  -[facility] per [facility]  -Discussed with DR Houser   77 y/o female with hx of subclinical hypothyroidism, CKD, Henoch Schonlein purpura in 1/23 s/p course of steroids with resolution, chronic LE edema on Bumex, Depression, HLD, Insomnia was sent to ED on 05/30/23, from Pulmonary office after presenting there with c/p progressive VALENZUELA and  chest tightness/heaviness, and  dizziness. Concern for ischemia.  TTE results on 05/31/23 consistent with Grade 1 diastolic dysfunction, EF is 60%,   Patient now presents to CCL for R/LHC to evaluate for CAD and to assess her right heart pressures.    Risk Assessments:  ASA: 2  Mallampati:3  Creat: 1.17  GFR: 47  BRA: 4.7%    Indication: Anginal/ischemic symptoms,  Grade 1 diastolic dysfunction    Coronary Anatomy: Unknown    Plan:    -plan for R/LHC with possible intervention  -preferred access:   -confirmed appropriate NPO duration  -ECG and Labs reviewed  -Aspirin 81mg po pre-cath  -NS 0.9% 250ml/hr x 1 bolus; pre procedure QUOC ppx   -procedure discussed with patient; risks and benefits explained, questions answered  -consent obtained by attending DR Houser    Interventional cardiology NP addendum note    Patient now s/p diagnostic  RHC and LHC  via  RFV and RFA, s/p RFA myx, RFV # 7 Fr sheath in place, RLE warm, perfusing NV status intact, peripheral pulses 2+    intraprocedurally  Patient received IV Sedation    Omnipaque: 46ML      Cath findings    < from: Cardiac Catheterization (06.01.23 @ 19:08) >    2.    Arterial Access - Right Femoral   3.    Venous Access - Right Femoral   4.    RHC   5.    Diagnostic Coronary Angiography   6.   Left Heart Cath   7.    Mynx     LM   Left main artery: Angiography shows no disease.      LAD   Left anterior descending artery: Angiography shows minor  irregularities.    CX   Circumflex: Angiography shows minor irregularities.      RCA   Right coronary artery: Angiographyshows minor irregularities.      Diagnostic Conclusions:     Moderate pulmonary HTN.   Normal LV fxn.   Minimal CAD.     Recommendations:     Med tx     Right Heart Pressures:  RA: 16/16/15  RV: 39/6/11  PCW: 18/18/15  PA: 40/8/24      -ADMIT as patient already being inpatient  met major criteria of Anginal symptoms, CKD,  S/P DIAGNOSTIC R/LHC today   -post cardiac cath orders  -ns 250ml iv bolus x1 post cath hydration for QUOC prophylaxis  RlE precautions  -S/P RFA Mynx  -Remove RFV sheath  - bedrest while RFV sheath in place  -bedrest x 3 hours post RFV sheath removal until 11 pm, patient can sit up at 10 PM, OOB at 11 pm   -C/W Current management including Lipitor 40mg po HS  -follow up outpt in 2 weeks with [facility], Cardiologist Dr Houser  - Explained to the patient regarding weight reduction, Tight control of BM, LDL, cholesterol management, regular cardiology Follow ups, compliance with medical regimen  -Further management per [facility]  -Discussed with DR Houser

## 2023-06-01 NOTE — PROGRESS NOTE ADULT - SUBJECTIVE AND OBJECTIVE BOX
Department of Cardiology                                                                  [facility]/[place] [address] [facility]                                                            Telephone: [phone]. Fax:[phone]                                                                          interventional Cardiology NP note          Patient is a 78y old  Female who presents with a chief complaint of VALENZUELA  Intermittent dizziness (31 May 2023 14:53)    Cardiology consulting for; Anginal symptoms, For R/ LHc to R/O Ischemia and to assess right heart hemoodynamics  Overnight events: none    Plan:  R/ LHc to R/O Ischemia and to assess right heart hemoodynamics    HPI:  77 y/o female with hx of subclinical hypothyroidism, CKD, Henoch Schonlein purpura in  s/p course of steroids with resolution, chronic LE edema on Bumex, Depression, HLD, Insomnia was sent to ED on 23, from Pulmonary office after presenting there with c/p progressive VALENZUELA to the point were she cant walk more than 10-15 feet without feeling SOB.  She admits to chest tightness/heaviness with the VALENZUELA as well as dizziness which both get better when she stops and rests. Patient denies HA, palpitations syncope.   TTE results on 23 consistent with Grade 1 diastolic dysfunction, EF is 60%,   Patient now presents to CCL for R/LHC to evaluate for CAD and to assess her right heart pressures.    .  MEDICAL AND SURGICAL History  Hypertension      Kidney stone      Hypothyroidism      Depressed      Mycosis fungoides      S/P hysterectomy      RADIOLOGY & ADDITIONAL STUDIES/DIAGNOSTIC TESTING:      ECHO: 23  Findings:    Normal global left ventricular systolic function.   Left ventricular ejection fraction:  60%.  Grade I diastolic dysfunction  Mild thickening of the anterior and posterior mitral valve leaflets.   Trace mitral valve regurgitation.   Mild tricuspid regurgitation.   Sclerotic aortic valve with normal opening.   Mild aortic regurgitation.   Mild pulmonic valve regurgitation.        STRESS; None in the past  FINDINGS:    CATHETERIZATION  FINDINGS: None in the past      Allergies    codeine (Faint)  penicillin (Unknown)    Intolerances: None      MEDICATIONS  (STANDING):  atorvastatin 40 milliGRAM(s) Oral at bedtime  dicyclomine 20 milliGRAM(s) Oral daily  FLUoxetine 10 milliGRAM(s) Oral daily  folic acid 1 milliGRAM(s) Oral daily  gabapentin 300 milliGRAM(s) Oral two times a day  guaiFENesin  milliGRAM(s) Oral every 12 hours  heparin   Injectable 5000 Unit(s) SubCutaneous every 12 hours  levothyroxine 25 MICROGram(s) Oral daily  sodium chloride 0.9% lock flush 3 milliLiter(s) IV Push every 8 hours  traZODone 100 milliGRAM(s) Oral at bedtime    MEDICATIONS  (PRN):  acetaminophen     Tablet .. 650 milliGRAM(s) Oral every 6 hours PRN Temp greater or equal to 38C (100.4F), Mild Pain (1 - 3)  aluminum hydroxide/magnesium hydroxide/simethicone Suspension 30 milliLiter(s) Oral every 4 hours PRN Dyspepsia  melatonin 3 milliGRAM(s) Oral at bedtime PRN Insomnia  ondansetron Injectable 4 milliGRAM(s) IV Push every 8 hours PRN Nausea and/or Vomiting      FAMILY HISTORY:  Family history of gastric cancer (Sibling)    SOCIAL HISTORY:    CIGARETTES: none    ALCOHOL:: None    REVIEW OF SYSTEMS:  General: No fevers/chills, or fatigue  HEENT: No visual disturbances, no hearing loss, no headaches, no epistaxis.  CV: No chest pain,no palpitations, no edema, no orthopnea, no PND, or VALENZUELA  Respiratory: No dyspnea, no wheeze, no cough.  GI: no nausea/vomiting, no black/bloody stools.  : No hematuria  Musculoskeletal: No myalgias, no arthralgias, no back pain.    Vital Signs Last 24 Hrs  T(C): 36.7 (2023 11:39), Max: 36.7 (31 May 2023 22:37)  T(F): 98.1 (2023 11:39), Max: 98.1 (31 May 2023 22:37)  HR: 60 (2023 15:37) (60 - 72)  BP: 174/75 (2023 15:37) (125/70 - 174/75)  BP(mean): --  RR: 16 (2023 15:37) (16 - 18)  SpO2: 97% (2023 15:37) (94% - 97%)    Parameters below as of 2023 15:37  Patient On (Oxygen Delivery Method): room air    Daily     Daily     I&O's Detail    PHYSICAL EXAM:   GENERAL: Pt lying comfortably, NAD.  ENMT: PERRL, +EOMI.  NECK: soft, Supple, No JVD,   CHEST/LUNG: Clear to auscultatation bilaterally; No wheezing.  HEART: S1S2+, Regular rate and rhythm; No murmurs.  ABDOMEN: Soft, Nontender, Nondistended; Bowel sounds present.  MUSCULOSKELETAL: Normal range of motion.  SKIN: No rashes or lesions.  NEURO: AAOX3, no focal deficits, no motor r sensory loss.  PSYCH: normal mood.  VASCULAR:   Radial +2 R/+2 L  Femoral +2 R/+2 L  PT +2 R/+2 L  DP +2 R/+2 L      INTERPRETATION OF TELEMETRY:    ECG::     LABS:                        10.8   5.34  )-----------( 172      ( 2023 04:06 )             34.5     06-    141  |  107  |  20.5<H>  ----------------------------<  112<H>  3.6   |  22.0  |  1.17    Ca    8.2<L>      2023 04:06  Phos  2.6     05-31  Mg     2.2     06-    TPro  5.8<L>  /  Alb  3.4  /  TBili  0.2<L>  /  DBili  x   /  AST  24  /  ALT  23  /  AlkPhos  111  06-01    CARDIAC MARKERS ( 31 May 2023 02:20 )  x     / x     / 63 U/L / x     / x      CARDIAC MARKERS ( 30 May 2023 18:17 )  x     / <0.01 ng/mL / x     / x     / x            Urinalysis Basic - ( 31 May 2023 03:00 )    Color: Yellow / Appearance: Clear / S.015 / pH: x  Gluc: x / Ketone: Negative  / Bili: Negative / Urobili: Negative mg/dL   Blood: x / Protein: 15 / Nitrite: Negative   Leuk Esterase: Negative / RBC: 3-5 /HPF / WBC 0-2 /HPF   Sq Epi: x / Non Sq Epi: x / Bacteria: Occasional      I&O's Summary    BNP: 323                                                                                 Department of Cardiology                                                                  [facility]/[place] [address] [address] [facility]                                                            Telephone: [phone]. Fax:[phone]                                                                          interventional Cardiology NP note          Patient is a 78y old  Female who presents with a chief complaint of VALENZUELA  Intermittent dizziness (31 May 2023 14:53)    Cardiology consulting for; Anginal symptoms, For R/ LHc to R/O Ischemia and to assess right heart hemoodynamics  Overnight events: none    Plan:  R/ LHc to R/O Ischemia and to assess right heart hemoodynamics    HPI:  77 y/o female with hx of subclinical hypothyroidism, CKD, Henoch Schonlein purpura in  s/p course of steroids with resolution, chronic LE edema on Bumex, Depression, HLD, Insomnia was sent to ED on 23, from Pulmonary office after presenting there with c/p progressive VALENZUELA to the point were she cant walk more than 10-15 feet without feeling SOB.  She admits to chest tightness/heaviness with the VALENZUELA as well as dizziness which both get better when she stops and rests. Patient denies HA, palpitations syncope.   TTE results on 23 consistent with Grade 1 diastolic dysfunction, EF is 60%,   Patient now presents to CCL for R/LHC to evaluate for CAD and to assess her right heart pressures.    .  MEDICAL AND SURGICAL History  Hypertension      Kidney stone      Hypothyroidism      Depressed      Mycosis fungoides      S/P hysterectomy      RADIOLOGY & ADDITIONAL STUDIES/DIAGNOSTIC TESTING:      ECHO: 23  Findings:    Normal global left ventricular systolic function.   Left ventricular ejection fraction:  60%.  Grade I diastolic dysfunction  Mild thickening of the anterior and posterior mitral valve leaflets.   Trace mitral valve regurgitation.   Mild tricuspid regurgitation.   Sclerotic aortic valve with normal opening.   Mild aortic regurgitation.   Mild pulmonic valve regurgitation.        STRESS; None in the past  FINDINGS:    CATHETERIZATION  FINDINGS: None in the past      Allergies    codeine (Faint)  penicillin (Unknown)    Intolerances: None      MEDICATIONS  (STANDING):  atorvastatin 40 milliGRAM(s) Oral at bedtime  dicyclomine 20 milliGRAM(s) Oral daily  FLUoxetine 10 milliGRAM(s) Oral daily  folic acid 1 milliGRAM(s) Oral daily  gabapentin 300 milliGRAM(s) Oral two times a day  guaiFENesin  milliGRAM(s) Oral every 12 hours  heparin   Injectable 5000 Unit(s) SubCutaneous every 12 hours  levothyroxine 25 MICROGram(s) Oral daily  sodium chloride 0.9% lock flush 3 milliLiter(s) IV Push every 8 hours  traZODone 100 milliGRAM(s) Oral at bedtime    MEDICATIONS  (PRN):  acetaminophen     Tablet .. 650 milliGRAM(s) Oral every 6 hours PRN Temp greater or equal to 38C (100.4F), Mild Pain (1 - 3)  aluminum hydroxide/magnesium hydroxide/simethicone Suspension 30 milliLiter(s) Oral every 4 hours PRN Dyspepsia  melatonin 3 milliGRAM(s) Oral at bedtime PRN Insomnia  ondansetron Injectable 4 milliGRAM(s) IV Push every 8 hours PRN Nausea and/or Vomiting      FAMILY HISTORY:  Family history of gastric cancer (Sibling)    SOCIAL HISTORY:    CIGARETTES: none    ALCOHOL:: None    REVIEW OF SYSTEMS:  General: No fevers/chills, or fatigue  HEENT: No visual disturbances, no hearing loss, no headaches, no epistaxis.  CV: No chest pain,no palpitations, no edema, no orthopnea, no PND, or VALENZUELA  Respiratory: No dyspnea, no wheeze, no cough.  GI: no nausea/vomiting, no black/bloody stools.  : No hematuria  Musculoskeletal: No myalgias, no arthralgias, no back pain.    Vital Signs Last 24 Hrs  T(C): 36.7 (2023 11:39), Max: 36.7 (31 May 2023 22:37)  T(F): 98.1 (2023 11:39), Max: 98.1 (31 May 2023 22:37)  HR: 60 (2023 15:37) (60 - 72)  BP: 174/75 (2023 15:37) (125/70 - 174/75)  BP(mean): --  RR: 16 (2023 15:37) (16 - 18)  SpO2: 97% (2023 15:37) (94% - 97%)    Parameters below as of 2023 15:37  Patient On (Oxygen Delivery Method): room air    Daily     Daily     I&O's Detail    PHYSICAL EXAM:   GENERAL: Pt lying comfortably, NAD.  ENMT: PERRL, +EOMI.  NECK: soft, Supple, No JVD,   CHEST/LUNG: Clear to auscultatation bilaterally; No wheezing.  HEART: S1S2+, Regular rate and rhythm; No murmurs.  ABDOMEN: Soft, Nontender, Nondistended; Bowel sounds present.  MUSCULOSKELETAL: Normal range of motion.  SKIN: No rashes or lesions.  NEURO: AAOX3, no focal deficits, no motor r sensory loss.  PSYCH: normal mood.  VASCULAR:   Radial +2 R/+2 L  Femoral +2 R/+2 L  PT +2 R/+2 L  DP +2 R/+2 L      INTERPRETATION OF TELEMETRY:    ECG:: SR, Non specific ST/T Abnormality, No acute ST/T changes    LABS:                        10.8   5.34  )-----------( 172      ( 2023 04:06 )             34.5     06-01    141  |  107  |  20.5<H>  ----------------------------<  112<H>  3.6   |  22.0  |  1.17    Ca    8.2<L>      2023 04:06  Phos  2.6     05-31  Mg     2.2     06-01    TPro  5.8<L>  /  Alb  3.4  /  TBili  0.2<L>  /  DBili  x   /  AST  24  /  ALT  23  /  AlkPhos  111  06-01    CARDIAC MARKERS ( 31 May 2023 02:20 )  x     / x     / 63 U/L / x     / x      CARDIAC MARKERS ( 30 May 2023 18:17 )  x     / <0.01 ng/mL / x     / x     / x            Urinalysis Basic - ( 31 May 2023 03:00 )    Color: Yellow / Appearance: Clear / S.015 / pH: x  Gluc: x / Ketone: Negative  / Bili: Negative / Urobili: Negative mg/dL   Blood: x / Protein: 15 / Nitrite: Negative   Leuk Esterase: Negative / RBC: 3-5 /HPF / WBC 0-2 /HPF   Sq Epi: x / Non Sq Epi: x / Bacteria: Occasional      I&O's Summary    BNP: 323

## 2023-06-01 NOTE — DISCHARGE NOTE PROVIDER - CARE PROVIDER_API CALL
Milind Houser  Cardiovascular Disease  [address] [place] [id number]  Phone: [phone]  Fax: [phone]  Follow Up Time:

## 2023-06-02 LAB
ANA TITR SER: NEGATIVE — SIGNIFICANT CHANGE UP
ANA TITR SER: NEGATIVE — SIGNIFICANT CHANGE UP
T4 FREE SERPL-MCNC: 1.2 NG/DL — SIGNIFICANT CHANGE UP (ref 0.9–1.8)

## 2023-06-02 PROCEDURE — 70551 MRI BRAIN STEM W/O DYE: CPT | Mod: 26

## 2023-06-02 PROCEDURE — 99232 SBSQ HOSP IP/OBS MODERATE 35: CPT

## 2023-06-02 RX ADMIN — SODIUM CHLORIDE 3 MILLILITER(S): 9 INJECTION INTRAMUSCULAR; INTRAVENOUS; SUBCUTANEOUS at 05:57

## 2023-06-02 RX ADMIN — ATORVASTATIN CALCIUM 40 MILLIGRAM(S): 80 TABLET, FILM COATED ORAL at 21:38

## 2023-06-02 RX ADMIN — Medication 20 MILLIGRAM(S): at 11:27

## 2023-06-02 RX ADMIN — GABAPENTIN 300 MILLIGRAM(S): 400 CAPSULE ORAL at 05:55

## 2023-06-02 RX ADMIN — Medication 25 MICROGRAM(S): at 05:55

## 2023-06-02 RX ADMIN — Medication 600 MILLIGRAM(S): at 05:55

## 2023-06-02 RX ADMIN — HEPARIN SODIUM 5000 UNIT(S): 5000 INJECTION INTRAVENOUS; SUBCUTANEOUS at 06:32

## 2023-06-02 RX ADMIN — SODIUM CHLORIDE 3 MILLILITER(S): 9 INJECTION INTRAMUSCULAR; INTRAVENOUS; SUBCUTANEOUS at 21:38

## 2023-06-02 RX ADMIN — Medication 100 MILLIGRAM(S): at 21:38

## 2023-06-02 RX ADMIN — HEPARIN SODIUM 5000 UNIT(S): 5000 INJECTION INTRAVENOUS; SUBCUTANEOUS at 17:22

## 2023-06-02 RX ADMIN — Medication 600 MILLIGRAM(S): at 17:23

## 2023-06-02 RX ADMIN — GABAPENTIN 300 MILLIGRAM(S): 400 CAPSULE ORAL at 17:22

## 2023-06-02 RX ADMIN — Medication 100 MILLIGRAM(S): at 00:57

## 2023-06-02 RX ADMIN — SODIUM CHLORIDE 3 MILLILITER(S): 9 INJECTION INTRAMUSCULAR; INTRAVENOUS; SUBCUTANEOUS at 14:36

## 2023-06-02 RX ADMIN — Medication 1 MILLIGRAM(S): at 11:27

## 2023-06-02 RX ADMIN — Medication 10 MILLIGRAM(S): at 11:27

## 2023-06-02 NOTE — PROGRESS NOTE ADULT - SUBJECTIVE AND OBJECTIVE BOX
[facility]                              [address] [place] [address]                                                 PHONE: [phone]                                                 FAX: [phone]  -----------------------------------------------------------------------------------------------  Pt seen and examined. FU for  shortness of breath     Overnight events/Complaints: Pt without complains.    Vital Signs Last 24 Hrs  T(C): 37.1 (02 Jun 2023 04:00), Max: 37.1 (02 Jun 2023 04:00)  T(F): 98.8 (02 Jun 2023 04:00), Max: 98.8 (02 Jun 2023 04:00)  HR: 74 (02 Jun 2023 04:00) (59 - 74)  BP: 141/61 (02 Jun 2023 04:00) (127/65 - 174/75)  BP(mean): --  RR: 18 (02 Jun 2023 04:00) (16 - 18)  SpO2: 95% (02 Jun 2023 04:00) (95% - 100%)    Parameters below as of 02 Jun 2023 04:00  Patient On (Oxygen Delivery Method): room air      I&O's Summary      RELEVENT PHYSICAL EXAM:  Cardiovascular: regular S1, S2  Respiratory: Lungs clear to auscultation; no crepitations, no wheeze  Musculoskeletal: No edema        LABS:                        10.8   5.34  )-----------( 172      ( 01 Jun 2023 04:06 )             34.5     06-01    141  |  107  |  20.5<H>  ----------------------------<  112<H>  3.6   |  22.0  |  1.17    Ca    8.2<L>      01 Jun 2023 04:06  Mg     2.2     06-01    TPro  5.8<L>  /  Alb  3.4  /  TBili  0.2<L>  /  DBili  x   /  AST  24  /  ALT  23  /  AlkPhos  111  06-01    RADIOLOGY & ADDITIONAL STUDIES: (reviewed)  CXR was independently visualized/reviewed and demonstrated: clear lungs    CARDIOLOGY TESTING:(reviewed)     ECG (Independent visualization): NSR     TELEMETRY independently visualized/reviewed and demonstrated : NSR 60-80    ECHOCARDIOGRAM independently visualized/reviewed and demonstrated :     Coronary Angiography   The coronary circulation is right dominant. Cardiac catheterization was performed urgently.  LM   Left main artery: Angiography shows no disease.    LAD   Left anterior descending artery: Angiography shows minor irregularities.  CX   Circumflex: Angiography shows minor irregularities.    RCA   Right coronary artery: Angiographyshows minor irregularities.    Left Heart Cath   LHC performed: Aortic valve crossed and left ventricular pressures were obtained.  Right Heart Pressures:       RA: 15       RV: 11       PA: 43/17/24       PCWP: 15       CO: 4.45 LPM       CI: 2.45 LPM       SVR: 22.91 Wood Units       PVR: 2.02 Wood Units    ECHO  Summary:   1. Normal left ventricular internal cavity size.   2. Normal global left ventricular systolic function.   3. Left ventricular ejection fraction, by visual estimation, is 60%.   4. Spectral Doppler shows impaired relaxation pattern of left   ventricular myocardial filling (Grade I diastolic dysfunction).   5. Normal right ventricular size and function.   6. The left atrium is normal in size.   7. The right atrium is normal in size.   8. Mild thickening of the anterior and posterior mitral valve leaflets.   9. Trace mitral valve regurgitation.  10. Mild tricuspid regurgitation.  11. Sclerotic aortic valve with normal opening.  12. Mild aortic regurgitation.  13. Mild pulmonic valve regurgitation.  14. There is no evidence of pericardial effusion.    MD Yumiko Electronically signed on 5/31/2023 at 8:22:44 PM  MEDICATIONS:(reviewed)  MEDICATIONS  (STANDING):  atorvastatin 40 milliGRAM(s) Oral at bedtime  dicyclomine 20 milliGRAM(s) Oral daily  FLUoxetine 10 milliGRAM(s) Oral daily  folic acid 1 milliGRAM(s) Oral daily  gabapentin 300 milliGRAM(s) Oral two times a day  guaiFENesin  milliGRAM(s) Oral every 12 hours  heparin   Injectable 5000 Unit(s) SubCutaneous every 12 hours  levothyroxine 25 MICROGram(s) Oral daily  sodium chloride 0.9% lock flush 3 milliLiter(s) IV Push every 8 hours  traZODone 100 milliGRAM(s) Oral at bedtime      ASSESSMENT AND PLAN:    78y Female with prior history of subclinical hypothyroidism, CKD, Henoch Schonlein purpura in 1/23 s/p course of steroids with resolution, chronic LE edema on Bumex, Depression, HLD, Insomnia who was sent in from Pulmonary office after presenting there with c/p progressive VALENZUELA.    s/p cath with no CAD and PCWP of 15  Continue statin  Echo with preserved LV function  Add low dose aldactone 12.5 mg daily on discharge  No further inpt cardiac work-up needed. Pls recall if any qns/concerns.     Will arrange outpt FU post discharge.

## 2023-06-02 NOTE — PROVIDER CONTACT NOTE (OTHER) - ACTION/TREATMENT ORDERED:
MD Robles made aware and states she already spoke to case management regarding situation. MD allowing patient to stay until morning, when she will be discharged home. GIN Sandoval made aware.

## 2023-06-02 NOTE — PROGRESS NOTE ADULT - SUBJECTIVE AND OBJECTIVE BOX
NEPHROLOGY INTERVAL HPI/OVERNIGHT EVENTS:    Comfortable flat   No new events   Meds noted     MEDICATIONS  (STANDING):  atorvastatin 40 milliGRAM(s) Oral at bedtime  dicyclomine 20 milliGRAM(s) Oral daily  FLUoxetine 10 milliGRAM(s) Oral daily  folic acid 1 milliGRAM(s) Oral daily  gabapentin 300 milliGRAM(s) Oral two times a day  guaiFENesin  milliGRAM(s) Oral every 12 hours  heparin   Injectable 5000 Unit(s) SubCutaneous every 12 hours  levothyroxine 25 MICROGram(s) Oral daily  sodium chloride 0.9% lock flush 3 milliLiter(s) IV Push every 8 hours  traZODone 100 milliGRAM(s) Oral at bedtime    MEDICATIONS  (PRN):  acetaminophen     Tablet .. 650 milliGRAM(s) Oral every 6 hours PRN Temp greater or equal to 38C (100.4F), Mild Pain (1 - 3)  aluminum hydroxide/magnesium hydroxide/simethicone Suspension 30 milliLiter(s) Oral every 4 hours PRN Dyspepsia  melatonin 3 milliGRAM(s) Oral at bedtime PRN Insomnia  ondansetron Injectable 4 milliGRAM(s) IV Push every 8 hours PRN Nausea and/or Vomiting      Allergies    codeine (Faint)  penicillin (Unknown)    Intolerances              Vital Signs Last 24 Hrs  T(C): 37 (02 Jun 2023 11:08), Max: 37.1 (02 Jun 2023 04:00)  T(F): 98.6 (02 Jun 2023 11:08), Max: 98.8 (02 Jun 2023 04:00)  HR: 81 (02 Jun 2023 11:08) (59 - 81)  BP: 158/71 (02 Jun 2023 11:08) (127/65 - 174/75)  BP(mean): --  RR: 18 (02 Jun 2023 11:08) (16 - 18)  SpO2: 96% (02 Jun 2023 11:08) (95% - 100%)    Parameters below as of 02 Jun 2023 11:08  Patient On (Oxygen Delivery Method): room air      Daily     Daily   I&O's Detail    I&O's Summary      PHYSICAL EXAM:    GENERAL: NAD,   EYES:  conjunctiva and sclera clear  NECK: Supple, No JVD/Bruit  NERVOUS SYSTEM:  A/O x3,   CHEST:  No rales or rhonchi  HEART:  RRR, No murmur  ABDOMEN: Soft, NT/ND BS+  EXTREMITIES:  min Edema;  LABS:                        10.8   5.34  )-----------( 172      ( 01 Jun 2023 04:06 )             34.5     06-01    141  |  107  |  20.5<H>  ----------------------------<  112<H>  3.6   |  22.0  |  1.17    Ca    8.2<L>      01 Jun 2023 04:06  Mg     2.2     06-01    TPro  5.8<L>  /  Alb  3.4  /  TBili  0.2<L>  /  DBili  x   /  AST  24  /  ALT  23  /  AlkPhos  111  06-01      < from: MR Head No Cont (06.02.23 @ 01:06) >  CC: [id number] EXAM:  MR BRAIN   ORDERED BY: DARRIUS MENA     PROCEDURE DATE:  06/02/2023          INTERPRETATION:  CLINICAL STATEMENT: Pain.    TECHNIQUE: MRI of the brain was performed without gadolinium.    COMPARISON: CT head 5/30/2023    FINDINGS:  There is mild diffuse parenchymal volume loss.    There are T2 prolongation signal abnormalities in the periventricular   subcortical white matter likely related to mild chronic microvascular   ischemic changes.    There is no acute parenchymal hemorrhage, parenchymal mass, mass effect   or midline shift. There is no extra-axial fluid collection.  There is no   hydrocephalus.  There is no acute infarct.    Mucosal thickening paranasal sinuses. Complete opacification right   maxillary sinus    IMPRESSION:  No acute intracranial hemorrhage or acute infarct.    --- End of Report ---            FRANCES ARAGON MD; Attending Radiologist  This document has been electronically signed. Jun 2 2023  7:58AM    < end of copied text >        < from: US Renal (05.31.23 @ 16:53) >  ACC: [id number] EXAM:  US KIDNEY(S)   ORDERED BY: CR BLANK     PROCEDURE DATE:  05/31/2023          INTERPRETATION:  CLINICAL INFORMATION: Acute renal failure    COMPARISON: CT scan 2/5/2015    TECHNIQUE: Sonography of the kidneys and bladder.    FINDINGS:  Right kidney: 8.2 cm. No renal mass, hydronephrosis or calculi. Lower   pole cyst measures 3.1 x 3.1 x 3.1 cm    Left kidney: 7.9 cm. No renal mass, hydronephrosis or calculi.    Urinary bladder: Within normal limits.    Incidental noteis made of echogenic liver suggestive of fatty   infiltration.    IMPRESSION:  Unremarkable renal ultrasound.    Unremarkable appearance of the bladder    --- End of Report ---      < end of copied text >      RADIOLOGY & ADDITIONAL TESTS:   NEPHROLOGY INTERVAL HPI/OVERNIGHT EVENTS:    Comfortable flat   No new events   Meds noted     MEDICATIONS  (STANDING):  atorvastatin 40 milliGRAM(s) Oral at bedtime  dicyclomine 20 milliGRAM(s) Oral daily  FLUoxetine 10 milliGRAM(s) Oral daily  folic acid 1 milliGRAM(s) Oral daily  gabapentin 300 milliGRAM(s) Oral two times a day  guaiFENesin  milliGRAM(s) Oral every 12 hours  heparin   Injectable 5000 Unit(s) SubCutaneous every 12 hours  levothyroxine 25 MICROGram(s) Oral daily  sodium chloride 0.9% lock flush 3 milliLiter(s) IV Push every 8 hours  traZODone 100 milliGRAM(s) Oral at bedtime    MEDICATIONS  (PRN):  acetaminophen     Tablet .. 650 milliGRAM(s) Oral every 6 hours PRN Temp greater or equal to 38C (100.4F), Mild Pain (1 - 3)  aluminum hydroxide/magnesium hydroxide/simethicone Suspension 30 milliLiter(s) Oral every 4 hours PRN Dyspepsia  melatonin 3 milliGRAM(s) Oral at bedtime PRN Insomnia  ondansetron Injectable 4 milliGRAM(s) IV Push every 8 hours PRN Nausea and/or Vomiting      Allergies    codeine (Faint)  penicillin (Unknown)    Intolerances              Vital Signs Last 24 Hrs  T(C): 37 (02 Jun 2023 11:08), Max: 37.1 (02 Jun 2023 04:00)  T(F): 98.6 (02 Jun 2023 11:08), Max: 98.8 (02 Jun 2023 04:00)  HR: 81 (02 Jun 2023 11:08) (59 - 81)  BP: 158/71 (02 Jun 2023 11:08) (127/65 - 174/75)  BP(mean): --  RR: 18 (02 Jun 2023 11:08) (16 - 18)  SpO2: 96% (02 Jun 2023 11:08) (95% - 100%)    Parameters below as of 02 Jun 2023 11:08  Patient On (Oxygen Delivery Method): room air      Daily     Daily   I&O's Detail    I&O's Summary      PHYSICAL EXAM:    GENERAL: NAD,   EYES:  conjunctiva and sclera clear  NECK: Supple, No JVD/Bruit  NERVOUS SYSTEM:  A/O x3,   CHEST:  No rales or rhonchi  HEART:  RRR, No murmur  ABDOMEN: Soft, NT/ND BS+  EXTREMITIES:  min Edema;  LABS:                        10.8   5.34  )-----------( 172      ( 01 Jun 2023 04:06 )             34.5     06-01    141  |  107  |  20.5<H>  ----------------------------<  112<H>  3.6   |  22.0  |  1.17    Ca    8.2<L>      01 Jun 2023 04:06  Mg     2.2     06-01    TPro  5.8<L>  /  Alb  3.4  /  TBili  0.2<L>  /  DBili  x   /  AST  24  /  ALT  23  /  AlkPhos  111  06-01      < from: MR Head No Cont (06.02.23 @ 01:06) >  CC: [id number] EXAM:  MR BRAIN   ORDERED BY: DARRIUS MENA     PROCEDURE DATE:  06/02/2023          INTERPRETATION:  CLINICAL STATEMENT: Pain.    TECHNIQUE: MRI of the brain was performed without gadolinium.    COMPARISON: CT head 5/30/2023    FINDINGS:  There is mild diffuse parenchymal volume loss.    There are T2 prolongation signal abnormalities in the periventricular   subcortical white matter likely related to mild chronic microvascular   ischemic changes.    There is no acute parenchymal hemorrhage, parenchymal mass, mass effect   or midline shift. There is no extra-axial fluid collection.  There is no   hydrocephalus.  There is no acute infarct.    Mucosal thickening paranasal sinuses. Complete opacification right   maxillary sinus    IMPRESSION:  No acute intracranial hemorrhage or acute infarct.    --- End of Report ---            FRANCES ARAGON MD; Attending Radiologist  This document has been electronically signed. Jun 2 2023  7:58AM    < end of copied text >        < from: US Renal (05.31.23 @ 16:53) >  ACC: [id number] EXAM:  US KIDNEY(S)   ORDERED BY: CR BLANK     PROCEDURE DATE:  05/31/2023          INTERPRETATION:  CLINICAL INFORMATION: Acute renal failure    COMPARISON: CT scan 2/5/2015    TECHNIQUE: Sonography of the kidneys and bladder.    FINDINGS:  Right kidney: 8.2 cm. No renal mass, hydronephrosis or calculi. Lower   pole cyst measures 3.1 x 3.1 x 3.1 cm    Left kidney: 7.9 cm. No renal mass, hydronephrosis or calculi.    Urinary bladder: Within normal limits.    Incidental noteis made of echogenic liver suggestive of fatty   infiltration.    IMPRESSION:  Unremarkable renal ultrasound.    Unremarkable appearance of the bladder    --- End of Report ---      < end of copied text >        Coronary Angiography   The coronary circulation is right dominant. Cardiac catheterization was performed urgently.  LM   Left main artery: Angiography shows no disease.    LAD   Left anterior descending artery: Angiography shows minor irregularities.  CX   Circumflex: Angiography shows minor irregularities.    RCA   Right coronary artery: Angiographyshows minor irregularities.    Left Heart Cath   LHC performed: Aortic valve crossed and left ventricular pressures were obtained.  Right Heart Pressures:       RA: 15       RV: 11       PA: 43/17/24       PCWP: 15       CO: 4.45 LPM       CI: 2.45 LPM       SVR: 22.91 Wood Units       PVR: 2.02 Wood Units    ECHO  Summary:   1. Normal left ventricular internal cavity size.   2. Normal global left ventricular systolic function.   3. Left ventricular ejection fraction, by visual estimation, is 60%.   4. Spectral Doppler shows impaired relaxation pattern of left   ventricular myocardial filling (Grade I diastolic dysfunction).   5. Normal right ventricular size and function.   6. The left atrium is normal in size.   7. The right atrium is normal in size.   8. Mild thickening of the anterior and posterior mitral valve leaflets.   9. Trace mitral valve regurgitation.  10. Mild tricuspid regurgitation.  11. Sclerotic aortic valve with normal opening.  12. Mild aortic regurgitation.  13. Mild pulmonic valve regurgitation.  14. There is no evidence of pericardial effusion.    MD Yumiko Electronically signed on 5/31/2023 at 8:22:44 PM

## 2023-06-02 NOTE — PROVIDER CONTACT NOTE (OTHER) - SITUATION
patient, and daughter at bedside, are refusing to be discharged home today and would like to stay one more night for monitoring.

## 2023-06-02 NOTE — PROGRESS NOTE ADULT - ASSESSMENT
79 y/o female with hx of subclinical hypothyroidism, CKD, Henoch Schonlein purpura in 1/23 s/p course of steroids with resolution, chronic LE edema on Bumex, Depression, HLD, Insomnia who was sent in from Pulmonary office after presenting there with c/p progressive VALENZUELA to the point were she cant walk more than 10-15 feet without feeling SOB. No hypoxia noted during todays exam at Pulmonary office. She admits to chest tightness/heaviness with the VALENZUELA as well as dizziness which both get better when she stops and rests. Denies any recent infections, travel, sick contacts, or changes in her mediations. She denies any other neurological complaints, no falls and no hx of VTE/CAD/CVA. She currently lives alone using a walker intermittently. Denies any /GI complaints, and her weight has not changed this year. In the ED vitals stable, no hypoxia noted at rest or with exertion. No evidence of ACS, PNA/CHF or anemia. Age adjusted ddimer and ESR kamari.. CTH negative, EKG non-ischemic.    Improved ERASTO   Normal renal sonogram  TEQUILA, Complements , HEP B / C and CPK Normal   Bumex held  79 y/o female with hx of subclinical hypothyroidism, CKD, Henoch Schonlein purpura in 1/23 s/p course of steroids with resolution, chronic LE edema on Bumex, Depression, HLD, Insomnia who was sent in from Pulmonary office after presenting there with c/p progressive VALENZUELA to the point were she cant walk more than 10-15 feet without feeling SOB. No hypoxia noted during todays exam at Pulmonary office. She admits to chest tightness/heaviness with the VALENZUELA as well as dizziness which both get better when she stops and rests. Denies any recent infections, travel, sick contacts, or changes in her mediations. She denies any other neurological complaints, no falls and no hx of VTE/CAD/CVA. She currently lives alone using a walker intermittently. Denies any /GI complaints, and her weight has not changed this year. In the ED vitals stable, no hypoxia noted at rest or with exertion. No evidence of ACS, PNA/CHF or anemia. Age adjusted ddimer and ESR kamari.. CTH negative, EKG non-ischemic.    Improved ERASTO   Normal renal sonogram  TEQUILA, Complements , HEP B / C and CPK Normal   Bumex held   ECHO noted   Renal function stable post cardiac cath

## 2023-06-02 NOTE — PROGRESS NOTE ADULT - SUBJECTIVE AND OBJECTIVE BOX
Department of Cardiology                                                                  [facility]/[place] [address] [place]                                                            Telephone: [phone]. Fax:[phone]                                                                      INTERVENTIONAL CARDIOLOGY CATH NOTE   *******INCOMPLETE NOTE*****     Subjective:  78y Female who had a left heart catheterization which showed:  Coronary Angiography   ·	The coronary circulation is right dominant. Cardiac catheterization was performed urgently.  LM   ·	Left main artery: Angiography shows no disease.    LAD   ·	Left anterior descending artery: Angiography shows minor irregularities.  CX   ·	Circumflex: Angiography shows minor irregularities.    RCA   ·	Right coronary artery: Angiographyshows minor irregularities.    Left Heart Cath   ·	LHC performed: Aortic valve crossed and left ventricular pressures were obtained.  Right Heart Pressures:       RA: 15       RV: 11       PA: 43/17/24       PCWP: 15       CO: 4.45 LPM       CI: 2.45 LPM       SVR: 22.91 Wood Units       PVR: 2.02 Wood Units    Interval history: No chest pain, SOB, or palpitations overnight.    HPI: 79 y/o female with hx of subclinical hypothyroidism, CKD, Henoch Schonlein purpura in 1/23 s/p course of steroids with resolution, chronic LE edema on Bumex, Depression, HLD, Insomnia who was sent in from Pulmonary office after presenting there with c/p progressive VALENZUELA to the point were she cant walk more than 10-15 feet without feeling SOB. No hypoxia noted during todays exam at Pulmonary office. She admits to chest tightness/heaviness with the VALENZUELA as well as dizziness which both get better when she stops and rests. Denies any recent infections, travel, sick contacts, or changes in her mediations. She denies any other neurological complaints, no falls and no hx of VTE/CAD/CVA. She currently lives alone using a walker intermittently. Denies any /GI complaints, and her weight has not changed this year. In the ED vitals stable, no hypoxia noted at rest or with exertion. No evidence of ACS, PNA/CHF or anemia. Age adjusted ddimer and ESR kamari.. CTH negative, EKG non-ischemic. Patient with reproducible exertional dyspnea. Patient seen with Son, who is ED Physician at bedside.  (30 May 2023 22:52)    PAST MEDICAL & SURGICAL HISTORY:  Hypertension  Kidney stone  Hypothyroidism  Depressed  Mycosis fungoides  S/P hysterectomy    Allergies  ·	codeine (Faint)  ·	penicillin (Unknown)    Home Medications:  acetaminophen 325 mg oral tablet: 2 tab(s) orally every 6 hours, As needed, Mild Pain (06 Feb 2015 17:32)  bumetanide 0.5 mg oral tablet: 1 tab(s) orally once a day (30 May 2023 23:06)  Colace sodium 100 mg oral capsule: 1 cap(s) orally 2 times a day  (11 Feb 2015 10:40)  dicyclomine 10 mg oral capsule: 2 orally once a day (30 May 2023 23:06)  FLUoxetine 10 mg oral capsule: 1 cap(s) orally once a day (06 Feb 2015 17:32)  Lipitor 40 mg oral tablet: 1 orally once a day (at bedtime) (30 May 2023 23:06)  multivitamin: 1 tab(s) orally once a day (11 Feb 2015 10:40)  Neurontin 300 mg oral capsule: 1 tab(s) orally 2 times a day (30 May 2023 23:06)    MEDICATIONS  (STANDING):  atorvastatin 40 milliGRAM(s) Oral at bedtime  dicyclomine 20 milliGRAM(s) Oral daily  FLUoxetine 10 milliGRAM(s) Oral daily  folic acid 1 milliGRAM(s) Oral daily  gabapentin 300 milliGRAM(s) Oral two times a day  guaiFENesin  milliGRAM(s) Oral every 12 hours  heparin   Injectable 5000 Unit(s) SubCutaneous every 12 hours  levothyroxine 25 MICROGram(s) Oral daily  sodium chloride 0.9% lock flush 3 milliLiter(s) IV Push every 8 hours  traZODone 100 milliGRAM(s) Oral at bedtime    MEDICATIONS  (PRN):  acetaminophen     Tablet .. 650 milliGRAM(s) Oral every 6 hours PRN Temp greater or equal to 38C (100.4F), Mild Pain (1 - 3)  aluminum hydroxide/magnesium hydroxide/simethicone Suspension 30 milliLiter(s) Oral every 4 hours PRN Dyspepsia  melatonin 3 milliGRAM(s) Oral at bedtime PRN Insomnia  ondansetron Injectable 4 milliGRAM(s) IV Push every 8 hours PRN Nausea and/or Vomiting    ROS:   General: No fatigue  HEENT: No headache, no epistaxis.  CV: No chest pain, no palpitations.  Respiratory: No SOB, no cough, no wheeze  GI: No nausea    Objective:  Vital Signs Last 24 Hrs  T(C): 37.1 (02 Jun 2023 04:00), Max: 37.1 (02 Jun 2023 04:00)  T(F): 98.8 (02 Jun 2023 04:00), Max: 98.8 (02 Jun 2023 04:00)  HR: 74 (02 Jun 2023 04:00) (59 - 74)  BP: 141/61 (02 Jun 2023 04:00) (127/65 - 174/75)  RR: 18 (02 Jun 2023 04:00) (16 - 18)  SpO2: 95% (02 Jun 2023 04:00) (95% - 100%)    Parameters below as of 02 Jun 2023 04:00  Patient On (Oxygen Delivery Method): room air    General: Awake, alert, speech clear, in no acute distress.  Chest: CTA, S1, S2, no murmurs.  Abdomen, Soft, nondistended  Right Groin: Soft, no bleeding, no hematoma.  Extremities: No edema, + distal pulses.                            10.8   5.34  )-----------( 172      ( 01 Jun 2023 04:06 )             34.5     06-01    141  |  107  |  20.5  ----------------------------<  112  3.6   |  22.0  |  1.17    Ca    8.2      01 Jun 2023 04:06  Mg     2.2     06-01    TPro  5.8  /  Alb  3.4  /  TBili  0.2  /  DBili  x   /  AST  24  /  ALT  23  /  AlkPhos  111  06-01                                                                                  Department of Cardiology                                                                  [facility]/[place] [address] [place]                                                            Telephone: [phone]. Fax:[phone]                                                                      INTERVENTIONAL CARDIOLOGY CATH NOTE   *******INCOMPLETE NOTE*****     Subjective:  78y Female who had a left heart catheterization which showed:  Coronary Angiography   ·	The coronary circulation is right dominant. Cardiac catheterization was performed urgently.  LM   ·	Left main artery: Angiography shows no disease.    LAD   ·	Left anterior descending artery: Angiography shows minor irregularities.  CX   ·	Circumflex: Angiography shows minor irregularities.    RCA   ·	Right coronary artery: Angiography shows minor irregularities.    Left Heart Cath   ·	LHC performed: Aortic valve crossed and left ventricular pressures were obtained.  Right Heart Pressures:       RA: 15       RV: 11       PA: 43/17/24       PCWP: 15       CO: 4.45 LPM       CI: 2.45 LPM       SVR: 22.91 Wood Units       PVR: 2.02 Wood Units    Interval history: No chest pain, SOB, or palpitations overnight.    HPI: 77 y/o female with hx of subclinical hypothyroidism, CKD, Henoch Schonlein purpura in 1/23 s/p course of steroids with resolution, chronic LE edema on Bumex, Depression, HLD, Insomnia who was sent in from Pulmonary office after presenting there with c/p progressive VALENZUELA to the point were she cant walk more than 10-15 feet without feeling SOB. No hypoxia noted during todays exam at Pulmonary office. She admits to chest tightness/heaviness with the VALENZUELA as well as dizziness which both get better when she stops and rests. Denies any recent infections, travel, sick contacts, or changes in her mediations. She denies any other neurological complaints, no falls and no hx of VTE/CAD/CVA. She currently lives alone using a walker intermittently. Denies any /GI complaints, and her weight has not changed this year. In the ED vitals stable, no hypoxia noted at rest or with exertion. No evidence of ACS, PNA/CHF or anemia. Age adjusted ddimer and ESR kamari.. CTH negative, EKG non-ischemic. Patient with reproducible exertional dyspnea. Patient seen with Son, who is ED Physician at bedside.  (30 May 2023 22:52)    PAST MEDICAL & SURGICAL HISTORY:  Hypertension  Kidney stone  Hypothyroidism  Depressed  Mycosis fungoides  S/P hysterectomy    Allergies  ·	codeine (Faint)  ·	penicillin (Unknown)    Home Medications:  acetaminophen 325 mg oral tablet: 2 tab(s) orally every 6 hours, As needed, Mild Pain (06 Feb 2015 17:32)  bumetanide 0.5 mg oral tablet: 1 tab(s) orally once a day (30 May 2023 23:06)  Colace sodium 100 mg oral capsule: 1 cap(s) orally 2 times a day  (11 Feb 2015 10:40)  dicyclomine 10 mg oral capsule: 2 orally once a day (30 May 2023 23:06)  FLUoxetine 10 mg oral capsule: 1 cap(s) orally once a day (06 Feb 2015 17:32)  Lipitor 40 mg oral tablet: 1 orally once a day (at bedtime) (30 May 2023 23:06)  multivitamin: 1 tab(s) orally once a day (11 Feb 2015 10:40)  Neurontin 300 mg oral capsule: 1 tab(s) orally 2 times a day (30 May 2023 23:06)    MEDICATIONS  (STANDING):  atorvastatin 40 milliGRAM(s) Oral at bedtime  dicyclomine 20 milliGRAM(s) Oral daily  FLUoxetine 10 milliGRAM(s) Oral daily  folic acid 1 milliGRAM(s) Oral daily  gabapentin 300 milliGRAM(s) Oral two times a day  guaiFENesin  milliGRAM(s) Oral every 12 hours  heparin   Injectable 5000 Unit(s) SubCutaneous every 12 hours  levothyroxine 25 MICROGram(s) Oral daily  sodium chloride 0.9% lock flush 3 milliLiter(s) IV Push every 8 hours  traZODone 100 milliGRAM(s) Oral at bedtime    MEDICATIONS  (PRN):  acetaminophen     Tablet .. 650 milliGRAM(s) Oral every 6 hours PRN Temp greater or equal to 38C (100.4F), Mild Pain (1 - 3)  aluminum hydroxide/magnesium hydroxide/simethicone Suspension 30 milliLiter(s) Oral every 4 hours PRN Dyspepsia  melatonin 3 milliGRAM(s) Oral at bedtime PRN Insomnia  ondansetron Injectable 4 milliGRAM(s) IV Push every 8 hours PRN Nausea and/or Vomiting    ROS:   General: No fatigue  HEENT: No headache, no epistaxis.  CV: No chest pain, no palpitations.  Respiratory: No SOB, no cough, no wheeze  GI: No nausea    Objective:  Vital Signs Last 24 Hrs  T(C): 37.1 (02 Jun 2023 04:00), Max: 37.1 (02 Jun 2023 04:00)  T(F): 98.8 (02 Jun 2023 04:00), Max: 98.8 (02 Jun 2023 04:00)  HR: 74 (02 Jun 2023 04:00) (59 - 74)  BP: 141/61 (02 Jun 2023 04:00) (127/65 - 174/75)  RR: 18 (02 Jun 2023 04:00) (16 - 18)  SpO2: 95% (02 Jun 2023 04:00) (95% - 100%)    Parameters below as of 02 Jun 2023 04:00  Patient On (Oxygen Delivery Method): room air    General: Awake, alert, speech clear, in no acute distress.  Chest: CTA, S1, S2, no murmurs.  Abdomen, Soft, nondistended  Right Groin: Soft, no bleeding, no hematoma.  Extremities: No edema, + distal pulses.                            10.8   5.34  )-----------( 172      ( 01 Jun 2023 04:06 )             34.5     06-01    141  |  107  |  20.5  ----------------------------<  112  3.6   |  22.0  |  1.17    Ca    8.2      01 Jun 2023 04:06  Mg     2.2     06-01    TPro  5.8  /  Alb  3.4  /  TBili  0.2  /  DBili  x   /  AST  24  /  ALT  23  /  AlkPhos  111  06-01                                                                                  Department of Cardiology                                                                  [facility]/[place] [address]  [place]                                                            Telephone: [phone]. Fax:[phone]                                                                      INTERVENTIONAL CARDIOLOGY CATH NOTE       Subjective:  78y Female who had a left heart catheterization which showed:  Coronary Angiography   ·	The coronary circulation is right dominant. Cardiac catheterization was performed urgently.  LM   ·	Left main artery: Angiography shows no disease.    LAD   ·	Left anterior descending artery: Angiography shows minor irregularities.  CX   ·	Circumflex: Angiography shows minor irregularities.    RCA   ·	Right coronary artery: Angiography shows minor irregularities.    Left Heart Cath   ·	LHC performed: Aortic valve crossed and left ventricular pressures were obtained.  Right Heart Pressures:       RA: 15       RV: 11       PA: 43/17/24       PCWP: 15       CO: 4.45 LPM       CI: 2.45 LPM       SVR: 22.91 Wood Units       PVR: 2.02 Wood Units    Interval history: No chest pain, SOB, or palpitations overnight.    HPI: 79 y/o female with hx of subclinical hypothyroidism, CKD, Henoch Schonlein purpura in 1/23 s/p course of steroids with resolution, chronic LE edema on Bumex, Depression, HLD, Insomnia who was sent in from Pulmonary office after presenting there with c/p progressive VALENZUELA to the point were she cant walk more than 10-15 feet without feeling SOB. No hypoxia noted during todays exam at Pulmonary office. She admits to chest tightness/heaviness with the VALENZUELA as well as dizziness which both get better when she stops and rests. Denies any recent infections, travel, sick contacts, or changes in her mediations. She denies any other neurological complaints, no falls and no hx of VTE/CAD/CVA. She currently lives alone using a walker intermittently. Denies any /GI complaints, and her weight has not changed this year. In the ED vitals stable, no hypoxia noted at rest or with exertion. No evidence of ACS, PNA/CHF or anemia. Age adjusted ddimer and ESR kamari.. CTH negative, EKG non-ischemic. Patient with reproducible exertional dyspnea. Patient seen with Son, who is ED Physician at bedside.  (30 May 2023 22:52)    PAST MEDICAL & SURGICAL HISTORY:  Hypertension  Kidney stone  Hypothyroidism  Depressed  Mycosis fungoides  S/P hysterectomy    Allergies  ·	codeine (Faint)  ·	penicillin (Unknown)    Home Medications:  acetaminophen 325 mg oral tablet: 2 tab(s) orally every 6 hours, As needed, Mild Pain (06 Feb 2015 17:32)  bumetanide 0.5 mg oral tablet: 1 tab(s) orally once a day (30 May 2023 23:06)  Colace sodium 100 mg oral capsule: 1 cap(s) orally 2 times a day  (11 Feb 2015 10:40)  dicyclomine 10 mg oral capsule: 2 orally once a day (30 May 2023 23:06)  FLUoxetine 10 mg oral capsule: 1 cap(s) orally once a day (06 Feb 2015 17:32)  Lipitor 40 mg oral tablet: 1 orally once a day (at bedtime) (30 May 2023 23:06)  multivitamin: 1 tab(s) orally once a day (11 Feb 2015 10:40)  Neurontin 300 mg oral capsule: 1 tab(s) orally 2 times a day (30 May 2023 23:06)    MEDICATIONS  (STANDING):  atorvastatin 40 milliGRAM(s) Oral at bedtime  dicyclomine 20 milliGRAM(s) Oral daily  FLUoxetine 10 milliGRAM(s) Oral daily  folic acid 1 milliGRAM(s) Oral daily  gabapentin 300 milliGRAM(s) Oral two times a day  guaiFENesin  milliGRAM(s) Oral every 12 hours  heparin   Injectable 5000 Unit(s) SubCutaneous every 12 hours  levothyroxine 25 MICROGram(s) Oral daily  sodium chloride 0.9% lock flush 3 milliLiter(s) IV Push every 8 hours  traZODone 100 milliGRAM(s) Oral at bedtime    MEDICATIONS  (PRN):  acetaminophen     Tablet .. 650 milliGRAM(s) Oral every 6 hours PRN Temp greater or equal to 38C (100.4F), Mild Pain (1 - 3)  aluminum hydroxide/magnesium hydroxide/simethicone Suspension 30 milliLiter(s) Oral every 4 hours PRN Dyspepsia  melatonin 3 milliGRAM(s) Oral at bedtime PRN Insomnia  ondansetron Injectable 4 milliGRAM(s) IV Push every 8 hours PRN Nausea and/or Vomiting    ROS:   General: No fatigue  HEENT: No headache, no epistaxis.  CV: No chest pain, no palpitations.  Respiratory: No SOB, no cough, no wheeze  GI: No nausea    Objective:  Vital Signs Last 24 Hrs  T(C): 37.1 (02 Jun 2023 04:00), Max: 37.1 (02 Jun 2023 04:00)  T(F): 98.8 (02 Jun 2023 04:00), Max: 98.8 (02 Jun 2023 04:00)  HR: 74 (02 Jun 2023 04:00) (59 - 74)  BP: 141/61 (02 Jun 2023 04:00) (127/65 - 174/75)  RR: 18 (02 Jun 2023 04:00) (16 - 18)  SpO2: 95% (02 Jun 2023 04:00) (95% - 100%)    Parameters below as of 02 Jun 2023 04:00  Patient On (Oxygen Delivery Method): room air    General: Awake, alert, speech clear, in no acute distress.  Chest: CTA, S1, S2, no murmurs.  Abdomen, Soft, nondistended  Right Groin: Soft, no bleeding, no hematoma.  Extremities: No edema, + distal pulses.                            10.8   5.34  )-----------( 172      ( 01 Jun 2023 04:06 )             34.5     06-01    141  |  107  |  20.5  ----------------------------<  112  3.6   |  22.0  |  1.17    Ca    8.2      01 Jun 2023 04:06  Mg     2.2     06-01    TPro  5.8  /  Alb  3.4  /  TBili  0.2  /  DBili  x   /  AST  24  /  ALT  23  /  AlkPhos  111  06-01

## 2023-06-02 NOTE — PROGRESS NOTE ADULT - ASSESSMENT
Assessment and Plant:   1. S/P R&LHC  ·	No significant CAD noted on LHC  ·	Some moderate pulmonary hypertension, PCWP 15  ·	Groin stable

## 2023-06-02 NOTE — PROGRESS NOTE ADULT - ASSESSMENT
79 y/o female with VALENZUELA/chest tightness, mild LFT elevation Hx of  CKD-4, HLD, subclincal hypothyroidism, Chronic LE edema, HSP, Insomnia, depression    VALENZUELA/Exertional CP:  -2D echo - EF 6-%  -Cath + mild CAD  - for mri head    ARF: ? ARF on CKD    lower leg  vasculitis Rx  with prednisone recent, proteinuria, microhematuria  Sonogram is norm  Repeat serologies sent  UA shows mod blood but urine culture negative   -Avoid nephrotoxins  -Hold Bumex  -cr improved  -consulted Dr Ricardo     HLD:  -Statin    Chronic LE edema:  -No hx of VTE, no calf pain  -Prior LE dopplers reported as neg  -Lymphedema? Pulm HTN?    Sub-clinical hypothyroidism:  -TSH 9  -start synthroid      HSP:  -Dx in 1/23, s/p steroids  -No new lesions noted  -ESR age adjusted is normal    Insomnia:  -Cont. Trazodone     Depression:  -Denies SI  -Cont. Fluoxetine    Mild LFT elevation:  -Statin induced?  -Hepatitis profile   -RUQ sono neg   79 y/o female with VALENZUELA/chest tightness, mild LFT elevation Hx of  CKD-4, HLD, subclincal hypothyroidism, Chronic LE edema, HSP, Insomnia, depression    VALENZUELA/Exertional CP:  -2D echo - EF 6-%  -Cath + mild CAD  - for mri head    ARF: ? ARF on CKD    lower leg  vasculitis Rx  with prednisone recent, proteinuria, microhematuria  Sonogram is norm  Repeat serologies sent  UA shows mod blood but urine culture negative   -Avoid nephrotoxins  -Hold Bumex  -cr improved  -consulted Dr Ricardo     HLD:  -Statin    Chronic LE edema:  -No hx of VTE, no calf pain  -Prior LE dopplers reported as neg  -Lymphedema? Pulm HTN?    Sub-clinical hypothyroidism:  -TSH 9  -start synthroid      HSP:  -Dx in 1/23, s/p steroids  -No new lesions noted  -ESR age adjusted is normal    Insomnia:  -Cont. Trazodone     Depression:  -Denies SI  -Cont. Fluoxetine    Mild LFT elevation:  -Statin induced?  -Hepatitis profile   -ABELARDO best    called and spoke to patients' daughter- who said that they are not available to take patient home today and will pick her in morning

## 2023-06-02 NOTE — PROGRESS NOTE ADULT - SUBJECTIVE AND OBJECTIVE BOX
JANINE RONQUILLO Patient is a 78y old  Female who presents with a chief complaint of VALENZUELA  Intermittent dizziness (02 Jun 2023 14:02)     HPI:  77 y/o female with hx of subclinical hypothyroidism, CKD, Henoch Schonlein purpura in 1/23 s/p course of steroids with resolution, chronic LE edema on Bumex, Depression, HLD, Insomnia who was sent in from Pulmonary office after presenting there with c/p progressive VALENZUELA to the point were she cant walk more than 10-15 feet without feeling SOB. No hypoxia noted during todays exam at Pulmonary office. She admits to chest tightness/heaviness with the VALENZUELA as well as dizziness which both get better when she stops and rests. Denies any recent infections, travel, sick contacts, or changes in her mediations. She denies any other neurological complaints, no falls and no hx of VTE/CAD/CVA. She currently lives alone using a walker intermittently. Denies any /GI complaints, and her weight has not changed this year. In the ED vitals stable, no hypoxia noted at rest or with exertion. No evidence of ACS, PNA/CHF or anemia. Age adjusted ddimer and ESR kamari.. CTH negative, EKG non-ischemic. Patient with reproducible exertional dyspnea. Patient seen with Son, who is ED Physician at bedside.  (30 May 2023 22:52)    The patient was seen and evaluated lying in bed offers no complaints states is sick of being in the hospital  The patient is in no acute distress.  Denied any fever chest pain, palpitations, shortness of breath, fever, dysuria, cough, edema       I&O's Summary    Allergies    codeine (Faint)  penicillin (Unknown)    Intolerances      HEALTH ISSUES - PROBLEM Dx:  Mild CAD  Coronary artery disease is a condition where the arteries the supply the heart muscle get clogges with fatty deposits & puts you at risk for a heart attack  Call your doctor if you have any new pain, pressure, or discomfort in the center of your chest, pain, tingling or discomfort in arms, back, neck, jaw, or stomach, shortness of breath, nausea, vomiting, burping or heartburn, sweating, cold and clammy skin, racing or abnormal heartbeat for more than 10 minutes or if they keep coming & going.  Call 911 and do not tr to get to hospital by care  You can help yourself with octaviayle changes (quitting smoking if you smoke), eat lots of fruits & vegetables & low fat dairy products, not a lot of meat & fatty foods, walk or some form of physical activity most days of the week, lose weight if you are overweight  Take your cardiac medication as prescribed to lower cholesterol, to lower blood pressure, aspirin to prevent blood clots, and diabetes control  Make sure to keep appointments with doctor for cardiac follow up care            PAST MEDICAL & SURGICAL HISTORY:  Hypertension      Kidney stone      Hypothyroidism      Depressed      Mycosis fungoides      S/P hysterectomy              Vital Signs Last 24 Hrs  T(C): 37 (02 Jun 2023 11:08), Max: 37.1 (02 Jun 2023 04:00)  T(F): 98.6 (02 Jun 2023 11:08), Max: 98.8 (02 Jun 2023 04:00)  HR: 81 (02 Jun 2023 11:08) (59 - 81)  BP: 158/71 (02 Jun 2023 11:08) (127/65 - 174/75)  BP(mean): --  RR: 18 (02 Jun 2023 11:08) (16 - 18)  SpO2: 96% (02 Jun 2023 11:08) (95% - 100%)    Parameters below as of 02 Jun 2023 11:08  Patient On (Oxygen Delivery Method): room air    T(C): 37 (06-02-23 @ 11:08), Max: 37.1 (06-02-23 @ 04:00)  HR: 81 (06-02-23 @ 11:08) (59 - 81)  BP: 158/71 (06-02-23 @ 11:08) (127/65 - 174/75)  RR: 18 (06-02-23 @ 11:08) (16 - 18)  SpO2: 96% (06-02-23 @ 11:08) (95% - 100%)  Wt(kg): --    PHYSICAL EXAM:    GENERAL: NAD  HEAD:  Atraumatic, Normocephalic  EYES: EOMI, PERRL, conjunctiva and sclera clear  ENMT:  Moist mucous membranes,  No lesions  NECK: Supple, No JVD, Normal thyroid  NERVOUS SYSTEM:  Alert & Oriented X3,  Moves upper and lower extremities; CNS-II-XII  CHEST/LUNG: Clear to auscultation bilaterally; No rales, rhonchi, wheezing,   HEART: Regular rate and rhythm; No murmurs,   ABDOMEN: Soft, Nontender, Nondistended; Bowel sounds present  EXTREMITIES:  Peripheral Pulses, No  cyanosis, or edema  SKIN: No rashes or lesions  psychiatry- mood and affect appropriate, Insight and judgement intact     acetaminophen     Tablet .. 650 milliGRAM(s) Oral every 6 hours PRN  aluminum hydroxide/magnesium hydroxide/simethicone Suspension 30 milliLiter(s) Oral every 4 hours PRN  atorvastatin 40 milliGRAM(s) Oral at bedtime  dicyclomine 20 milliGRAM(s) Oral daily  FLUoxetine 10 milliGRAM(s) Oral daily  folic acid 1 milliGRAM(s) Oral daily  gabapentin 300 milliGRAM(s) Oral two times a day  guaiFENesin  milliGRAM(s) Oral every 12 hours  heparin   Injectable 5000 Unit(s) SubCutaneous every 12 hours  levothyroxine 25 MICROGram(s) Oral daily  melatonin 3 milliGRAM(s) Oral at bedtime PRN  ondansetron Injectable 4 milliGRAM(s) IV Push every 8 hours PRN  sodium chloride 0.9% lock flush 3 milliLiter(s) IV Push every 8 hours  traZODone 100 milliGRAM(s) Oral at bedtime      LABS:                          10.8   5.34  )-----------( 172      ( 01 Jun 2023 04:06 )             34.5     06-01    141  |  107  |  20.5<H>  ----------------------------<  112<H>  3.6   |  22.0  |  1.17    Ca    8.2<L>      01 Jun 2023 04:06  Mg     2.2     06-01    TPro  5.8<L>  /  Alb  3.4  /  TBili  0.2<L>  /  DBili  x   /  AST  24  /  ALT  23  /  AlkPhos  111  06-01    LIVER FUNCTIONS - ( 01 Jun 2023 04:06 )  Alb: 3.4 g/dL / Pro: 5.8 g/dL / ALK PHOS: 111 U/L / ALT: 23 U/L / AST: 24 U/L / GGT: x                   CAPILLARY BLOOD GLUCOSE          RADIOLOGY & ADDITIONAL TESTS:      Consultant notes reviewed    Case discussed with consultant/provider/ family /patient

## 2023-06-02 NOTE — PROGRESS NOTE ADULT - SUBJECTIVE AND OBJECTIVE BOX
Neurology.    JANINE RONQUILLO 78y Female     Patient is a 78y old  Female who presents with a chief complaint of VALENZUELA  Intermittent dizziness (31 May 2023 10:32)      HPI:  77 y/o female with hx of subclinical hypothyroidism, CKD, Henoch Schonlein purpura in 1/23 s/p course of steroids with resolution, chronic LE edema on Bumex, Depression, HLD, Insomnia who was sent in from Pulmonary office after presenting there with c/p progressive VALENZUELA to the point were she cant walk more than 10-15 feet without feeling SOB. No hypoxia noted during todays exam at Pulmonary office. She admits to chest tightness/heaviness with the VALENZUELA as well as dizziness which both get better when she stops and rests. Denies any recent infections, travel, sick contacts, or changes in her mediations. She denies any other neurological complaints, no falls and no hx of VTE/CAD/CVA. She currently lives alone using a walker intermittently. Denies any /GI complaints, and her weight has not changed this year. In the ED vitals stable, no hypoxia noted at rest or with exertion. No evidence of ACS, PNA/CHF or anemia. Age adjusted ddimer and ESR kamari.. CTH negative, EKG non-ischemic. Patient with reproducible exertional dyspnea. Patient seen with Son, who is ED Physician at bedside.  (30 May 2023 22:52)    PMH: Hypertension    Kidney stone    Hypothyroidism    Depressed    Mycosis fungoides         PSH: S/P hysterectomy          FAMILY HISTORY:  Family history of gastric cancer (Sibling)        SOCIAL HISTORY:  No history of tobacco or alcohol use     Allergies    codeine (Faint)  penicillin (Unknown)    Intolerances        Height (cm): 162.6 (05-31 @ 12:59)  Weight (kg): 76.7 (05-31 @ 12:59)  BMI (kg/m2): 29 (05-31 @ 12:59)        Vital Signs Last 24 Hrs  T(C): 37 (02 Jun 2023 11:08), Max: 37.1 (02 Jun 2023 04:00)  T(F): 98.6 (02 Jun 2023 11:08), Max: 98.8 (02 Jun 2023 04:00)  HR: 81 (02 Jun 2023 11:08) (59 - 81)  BP: 158/71 (02 Jun 2023 11:08) (127/65 - 174/75)  BP(mean): --  RR: 18 (02 Jun 2023 11:08) (16 - 18)  SpO2: 96% (02 Jun 2023 11:08) (95% - 100%)    Parameters below as of 02 Jun 2023 11:08  Patient On (Oxygen Delivery Method): room air        MEDICATIONS    acetaminophen     Tablet .. 650 milliGRAM(s) Oral every 6 hours PRN  aluminum hydroxide/magnesium hydroxide/simethicone Suspension 30 milliLiter(s) Oral every 4 hours PRN  atorvastatin 40 milliGRAM(s) Oral at bedtime  dicyclomine 20 milliGRAM(s) Oral daily  FLUoxetine 10 milliGRAM(s) Oral daily  folic acid 1 milliGRAM(s) Oral daily  gabapentin 300 milliGRAM(s) Oral two times a day  guaiFENesin  milliGRAM(s) Oral every 12 hours  heparin   Injectable 5000 Unit(s) SubCutaneous every 12 hours  levothyroxine 25 MICROGram(s) Oral daily  melatonin 3 milliGRAM(s) Oral at bedtime PRN  ondansetron Injectable 4 milliGRAM(s) IV Push every 8 hours PRN  sodium chloride 0.9% lock flush 3 milliLiter(s) IV Push every 8 hours  traZODone 100 milliGRAM(s) Oral at bedtime         LABS:  CBC Full  -  ( 01 Jun 2023 04:06 )  WBC Count : 5.34 K/uL  RBC Count : 3.72 M/uL  Hemoglobin : 10.8 g/dL  Hematocrit : 34.5 %  Platelet Count - Automated : 172 K/uL  Mean Cell Volume : 92.7 fl  Mean Cell Hemoglobin : 29.0 pg  Mean Cell Hemoglobin Concentration : 31.3 gm/dL  Auto Neutrophil # : x  Auto Lymphocyte # : x  Auto Monocyte # : x  Auto Eosinophil # : x  Auto Basophil # : x  Auto Neutrophil % : x  Auto Lymphocyte % : x  Auto Monocyte % : x  Auto Eosinophil % : x  Auto Basophil % : x      06-01    141  |  107  |  20.5<H>  ----------------------------<  112<H>  3.6   |  22.0  |  1.17    Ca    8.2<L>      01 Jun 2023 04:06  Mg     2.2     06-01    TPro  5.8<L>  /  Alb  3.4  /  TBili  0.2<L>  /  DBili  x   /  AST  24  /  ALT  23  /  AlkPhos  111  06-01    LIVER FUNCTIONS - ( 01 Jun 2023 04:06 )  Alb: 3.4 g/dL / Pro: 5.8 g/dL / ALK PHOS: 111 U/L / ALT: 23 U/L / AST: 24 U/L / GGT: x             On Neurological Examination:    Mental Status - Patient is  awake, alert and oriented X3.  Speech is fluent. Patient can name, repeat and follow commands correctly  There is no dysarthria.    Cranial Nerves - PERRL, EOMI,  Visual fields are full to finger counting, no gross facial asymmetry, tongue/uvula midline    Motor Exam -   Right upper ---5/5 No drift  Left upper ---5/5 No drift  Right lower ---5/5 No drift  Left lower  ---5/5 No drift     nml bulk/tone    Sensory    Intact to light touch and pinprick bilaterally    Coord: FTN intact bilaterally     Gait -  Not assessed.          RADIOLOGY ( All neurological imaging studies were independently reviewed and interpreted by me)  CTH     CT Head No Cont (05.30.23 @ 20:42) >  IMPRESSION:  No mass effect, hemorrhage or evidence of acute intracranial pathology.    MARIELLE ALVES MD; Attending Radiologist      CTA  CTP  MRI:  TTE

## 2023-06-02 NOTE — PROGRESS NOTE ADULT - ASSESSMENT
IMPRESSION:  - Intermittent dizziness.  - Exertional dyspnea.      ASSESSMENT/ PLAN:       - General Neuro checks and vital signs Q 4 hours.  - SBP goalkeep normotensive.  - Telemetry monitoring.  - CT Head -images and reports were reviewed.   - MRI Brain no contrast.  - TTE   - PT OT SLP evaluation.  - SCD/ SQ Heparin for DVT prophylaxis.

## 2023-06-03 VITALS
SYSTOLIC BLOOD PRESSURE: 108 MMHG | TEMPERATURE: 98 F | DIASTOLIC BLOOD PRESSURE: 58 MMHG | OXYGEN SATURATION: 96 % | HEART RATE: 74 BPM | RESPIRATION RATE: 18 BRPM

## 2023-06-03 PROCEDURE — 85025 COMPLETE CBC W/AUTO DIFF WBC: CPT

## 2023-06-03 PROCEDURE — 85379 FIBRIN DEGRADATION QUANT: CPT

## 2023-06-03 PROCEDURE — 86160 COMPLEMENT ANTIGEN: CPT

## 2023-06-03 PROCEDURE — 80048 BASIC METABOLIC PNL TOTAL CA: CPT

## 2023-06-03 PROCEDURE — 70551 MRI BRAIN STEM W/O DYE: CPT

## 2023-06-03 PROCEDURE — 82746 ASSAY OF FOLIC ACID SERUM: CPT

## 2023-06-03 PROCEDURE — C1894: CPT

## 2023-06-03 PROCEDURE — C1769: CPT

## 2023-06-03 PROCEDURE — 86038 ANTINUCLEAR ANTIBODIES: CPT

## 2023-06-03 PROCEDURE — C1887: CPT

## 2023-06-03 PROCEDURE — 36415 COLL VENOUS BLD VENIPUNCTURE: CPT

## 2023-06-03 PROCEDURE — 82607 VITAMIN B-12: CPT

## 2023-06-03 PROCEDURE — 36600 WITHDRAWAL OF ARTERIAL BLOOD: CPT

## 2023-06-03 PROCEDURE — 84443 ASSAY THYROID STIM HORMONE: CPT

## 2023-06-03 PROCEDURE — 82550 ASSAY OF CK (CPK): CPT

## 2023-06-03 PROCEDURE — 84439 ASSAY OF FREE THYROXINE: CPT

## 2023-06-03 PROCEDURE — 99239 HOSP IP/OBS DSCHRG MGMT >30: CPT

## 2023-06-03 PROCEDURE — 99285 EMERGENCY DEPT VISIT HI MDM: CPT

## 2023-06-03 PROCEDURE — 83735 ASSAY OF MAGNESIUM: CPT

## 2023-06-03 PROCEDURE — 84100 ASSAY OF PHOSPHORUS: CPT

## 2023-06-03 PROCEDURE — 84436 ASSAY OF TOTAL THYROXINE: CPT

## 2023-06-03 PROCEDURE — 84484 ASSAY OF TROPONIN QUANT: CPT

## 2023-06-03 PROCEDURE — 83036 HEMOGLOBIN GLYCOSYLATED A1C: CPT

## 2023-06-03 PROCEDURE — 86162 COMPLEMENT TOTAL (CH50): CPT

## 2023-06-03 PROCEDURE — 93005 ELECTROCARDIOGRAM TRACING: CPT

## 2023-06-03 PROCEDURE — 70450 CT HEAD/BRAIN W/O DYE: CPT | Mod: MA

## 2023-06-03 PROCEDURE — 80053 COMPREHEN METABOLIC PANEL: CPT

## 2023-06-03 PROCEDURE — 85027 COMPLETE CBC AUTOMATED: CPT

## 2023-06-03 PROCEDURE — 83880 ASSAY OF NATRIURETIC PEPTIDE: CPT

## 2023-06-03 PROCEDURE — 93456 R HRT CORONARY ARTERY ANGIO: CPT

## 2023-06-03 PROCEDURE — 94150 VITAL CAPACITY TEST: CPT

## 2023-06-03 PROCEDURE — 87086 URINE CULTURE/COLONY COUNT: CPT

## 2023-06-03 PROCEDURE — 85652 RBC SED RATE AUTOMATED: CPT

## 2023-06-03 PROCEDURE — C1760: CPT

## 2023-06-03 PROCEDURE — 82803 BLOOD GASES ANY COMBINATION: CPT

## 2023-06-03 PROCEDURE — C1889: CPT

## 2023-06-03 PROCEDURE — 76775 US EXAM ABDO BACK WALL LIM: CPT

## 2023-06-03 PROCEDURE — 81001 URINALYSIS AUTO W/SCOPE: CPT

## 2023-06-03 PROCEDURE — C8929: CPT

## 2023-06-03 PROCEDURE — 76705 ECHO EXAM OF ABDOMEN: CPT

## 2023-06-03 PROCEDURE — 86140 C-REACTIVE PROTEIN: CPT

## 2023-06-03 PROCEDURE — 80074 ACUTE HEPATITIS PANEL: CPT

## 2023-06-03 PROCEDURE — 71046 X-RAY EXAM CHEST 2 VIEWS: CPT

## 2023-06-03 RX ORDER — BUMETANIDE 0.25 MG/ML
1 INJECTION INTRAMUSCULAR; INTRAVENOUS
Refills: 0 | DISCHARGE

## 2023-06-03 RX ORDER — TRAZODONE HCL 50 MG
1 TABLET ORAL
Qty: 0 | Refills: 0 | DISCHARGE
Start: 2023-06-03

## 2023-06-03 RX ORDER — LEVOTHYROXINE SODIUM 125 MCG
1 TABLET ORAL
Qty: 30 | Refills: 0
Start: 2023-06-03 | End: 2023-07-02

## 2023-06-03 RX ORDER — LANOLIN ALCOHOL/MO/W.PET/CERES
1 CREAM (GRAM) TOPICAL
Qty: 0 | Refills: 0 | DISCHARGE
Start: 2023-06-03

## 2023-06-03 RX ADMIN — Medication 1 MILLIGRAM(S): at 11:34

## 2023-06-03 RX ADMIN — Medication 10 MILLIGRAM(S): at 11:34

## 2023-06-03 RX ADMIN — Medication 25 MICROGRAM(S): at 05:53

## 2023-06-03 RX ADMIN — GABAPENTIN 300 MILLIGRAM(S): 400 CAPSULE ORAL at 05:53

## 2023-06-03 RX ADMIN — SODIUM CHLORIDE 3 MILLILITER(S): 9 INJECTION INTRAMUSCULAR; INTRAVENOUS; SUBCUTANEOUS at 13:14

## 2023-06-03 RX ADMIN — HEPARIN SODIUM 5000 UNIT(S): 5000 INJECTION INTRAVENOUS; SUBCUTANEOUS at 05:53

## 2023-06-03 RX ADMIN — Medication 20 MILLIGRAM(S): at 11:34

## 2023-06-03 RX ADMIN — Medication 600 MILLIGRAM(S): at 05:53

## 2023-06-03 RX ADMIN — SODIUM CHLORIDE 3 MILLILITER(S): 9 INJECTION INTRAMUSCULAR; INTRAVENOUS; SUBCUTANEOUS at 05:53

## 2023-06-03 NOTE — PROGRESS NOTE ADULT - REASON FOR ADMISSION
VALENZUELA  Intermittent dizziness
VALENZUELA  Intermittent dizziness  For R/LHC
VALENZUELA  Intermittent dizziness

## 2023-06-03 NOTE — PROGRESS NOTE ADULT - ASSESSMENT
77 y/o female with hx of subclinical hypothyroidism, CKD, Henoch Schonlein purpura in 1/23 s/p course of steroids with resolution, chronic LE edema on Bumex, Depression, HLD, Insomnia who was sent in from Pulmonary office after presenting there with c/p progressive VALENZUELA to the point were she cant walk more than 10-15 feet without feeling SOB. No hypoxia noted during todays exam at Pulmonary office. She admits to chest tightness/heaviness with the VALENZUELA as well as dizziness which both get better when she stops and rests. Denies any recent infections, travel, sick contacts, or changes in her mediations. She denies any other neurological complaints, no falls and no hx of VTE/CAD/CVA. She currently lives alone using a walker intermittently. Denies any /GI complaints, and her weight has not changed this year. In the ED vitals stable, no hypoxia noted at rest or with exertion. No evidence of ACS, PNA/CHF or anemia. Age adjusted ddimer and ESR kamari.. CTH negative, EKG non-ischemic.    Improved ERASTO   Normal renal sonogram  TEQUILA, Complements , HEP B / C and CPK Normal   Bumex held   ECHO noted   Renal function stable post cardiac cath     Follows as an outpatient with Dr Gallegos   Cleared from a renal perspective for Discharge

## 2023-06-03 NOTE — PROGRESS NOTE ADULT - SUBJECTIVE AND OBJECTIVE BOX
NEPHROLOGY INTERVAL HPI/OVERNIGHT EVENTS:    Feels better   MRI brain 6/2 OK  No new complaints     MEDICATIONS  (STANDING):  atorvastatin 40 milliGRAM(s) Oral at bedtime  dicyclomine 20 milliGRAM(s) Oral daily  FLUoxetine 10 milliGRAM(s) Oral daily  folic acid 1 milliGRAM(s) Oral daily  gabapentin 300 milliGRAM(s) Oral two times a day  guaiFENesin  milliGRAM(s) Oral every 12 hours  heparin   Injectable 5000 Unit(s) SubCutaneous every 12 hours  levothyroxine 25 MICROGram(s) Oral daily  sodium chloride 0.9% lock flush 3 milliLiter(s) IV Push every 8 hours  traZODone 100 milliGRAM(s) Oral at bedtime    MEDICATIONS  (PRN):  acetaminophen     Tablet .. 650 milliGRAM(s) Oral every 6 hours PRN Temp greater or equal to 38C (100.4F), Mild Pain (1 - 3)  aluminum hydroxide/magnesium hydroxide/simethicone Suspension 30 milliLiter(s) Oral every 4 hours PRN Dyspepsia  melatonin 3 milliGRAM(s) Oral at bedtime PRN Insomnia  ondansetron Injectable 4 milliGRAM(s) IV Push every 8 hours PRN Nausea and/or Vomiting      Allergies    codeine (Faint)  penicillin (Unknown)    Intolerances            Vital Signs Last 24 Hrs  T(C): 37 (03 Jun 2023 04:50), Max: 37.5 (02 Jun 2023 17:16)  T(F): 98.6 (03 Jun 2023 04:50), Max: 99.5 (02 Jun 2023 17:16)  HR: 62 (03 Jun 2023 04:50) (62 - 81)  BP: 116/65 (03 Jun 2023 04:50) (116/65 - 182/74)  BP(mean): 92 (02 Jun 2023 20:15) (92 - 92)  RR: 18 (03 Jun 2023 04:50) (18 - 18)  SpO2: 95% (03 Jun 2023 04:50) (94% - 98%)    Parameters below as of 03 Jun 2023 04:50  Patient On (Oxygen Delivery Method): room air      Daily     Daily   I&O's Detail    02 Jun 2023 07:01  -  03 Jun 2023 07:00  --------------------------------------------------------  IN:    Oral Fluid: 240 mL  Total IN: 240 mL    OUT:  Total OUT: 0 mL    Total NET: 240 mL        I&O's Summary    02 Jun 2023 07:01  -  03 Jun 2023 07:00  --------------------------------------------------------  IN: 240 mL / OUT: 0 mL / NET: 240 mL        PHYSICAL EXAM:    GENERAL: NAD,   EYES:  conjunctiva and sclera clear  NECK: Supple, No JVD/Bruit  NERVOUS SYSTEM:  A/O x3,   CHEST:  No rales or rhonchi  HEART:  RRR, No murmur  ABDOMEN: Soft, NT/ND BS+  EXTREMITIES:  min Edema;    LABS:                < from: US Renal (05.31.23 @ 16:53) >    ACC: [id number] EXAM:  US KIDNEY(S)   ORDERED BY: CR BLANK     PROCEDURE DATE:  05/31/2023          INTERPRETATION:  CLINICAL INFORMATION: Acute renal failure    COMPARISON: CT scan 2/5/2015    TECHNIQUE: Sonography of the kidneys and bladder.    FINDINGS:  Right kidney: 8.2 cm. No renal mass, hydronephrosis or calculi. Lower   pole cyst measures 3.1 x 3.1 x 3.1 cm    Left kidney: 7.9 cm. No renal mass, hydronephrosis or calculi.    Urinary bladder: Within normal limits.    Incidental noteis made of echogenic liver suggestive of fatty   infiltration.    IMPRESSION:  Unremarkable renal ultrasound.    Unremarkable appearance of the bladder    --- End of Report ---      < from: MR Head No Cont (06.02.23 @ 01:06) >    ACC: [id number] EXAM:  MR BRAIN   ORDERED BY: DARRIUS MENA     PROCEDURE DATE:  06/02/2023          INTERPRETATION:  CLINICAL STATEMENT: Pain.    TECHNIQUE: MRI of the brain was performed without gadolinium.    COMPARISON: CT head 5/30/2023    FINDINGS:  There is mild diffuse parenchymal volume loss.    There are T2 prolongation signal abnormalities in the periventricular   subcortical white matter likely related to mild chronic microvascular   ischemic changes.    There is no acute parenchymal hemorrhage, parenchymal mass, mass effect   or midline shift. There is no extra-axial fluid collection.  There is no   hydrocephalus.  There is no acute infarct.    Mucosal thickening paranasal sinuses. Complete opacification right   maxillary sinus    IMPRESSION:  No acute intracranial hemorrhage or acute infarct.    --- End of Report ---            FRNACES ARAGON MD; Attending Radiologist  This document has been electronically signed. Jun 2 2023  7:58AM    < end of copied text >        REGAN SNOW MD; Attending Radiologist  This document has been electronically signed. May 31 2023  6:15PM    < end of copied text >      < from: MR Head No Cont (06.02.23 @ 01:06) >      < end of copied text >    RADIOLOGY & ADDITIONAL TESTS:

## 2023-06-03 NOTE — PROGRESS NOTE ADULT - PROVIDER SPECIALTY LIST ADULT
Hospitalist
Intervent Cardiology
Neurology
Cardiology
Internal Medicine
Nephrology
Hospitalist
Intervent Cardiology
Nephrology
Nephrology

## 2023-07-28 ENCOUNTER — OFFICE (OUTPATIENT)
Dept: URBAN - METROPOLITAN AREA CLINIC 104 | Facility: CLINIC | Age: 78
Setting detail: OPHTHALMOLOGY
End: 2023-07-28
Payer: MEDICARE

## 2023-07-28 DIAGNOSIS — H01.005: ICD-10-CM

## 2023-07-28 DIAGNOSIS — H35.40: ICD-10-CM

## 2023-07-28 DIAGNOSIS — H16.223: ICD-10-CM

## 2023-07-28 DIAGNOSIS — H01.002: ICD-10-CM

## 2023-07-28 DIAGNOSIS — H25.13: ICD-10-CM

## 2023-07-28 DIAGNOSIS — H01.004: ICD-10-CM

## 2023-07-28 DIAGNOSIS — H01.001: ICD-10-CM

## 2023-07-28 PROCEDURE — 92014 COMPRE OPH EXAM EST PT 1/>: CPT | Performed by: OPTOMETRIST

## 2023-07-28 ASSESSMENT — DECREASING TEAR LAKE - SEVERITY SCORE
OD_DEC_TEARLAKE: 1+
OS_DEC_TEARLAKE: 1+

## 2023-07-28 ASSESSMENT — KERATOMETRY
OD_K2POWER_DIOPTERS: 44.76
OD_K1POWER_DIOPTERS: 43.10
OS_K1POWER_DIOPTERS: 44.23
OD_AXISANGLE_DEGREES: 175
OS_AXISANGLE_DEGREES: 032
OS_K2POWER_DIOPTERS: 45.92

## 2023-07-28 ASSESSMENT — REFRACTION_AUTOREFRACTION
OD_SPHERE: PL
OD_CYLINDER: -2.25
OS_SPHERE: PL
OD_AXIS: 066
OS_CYLINDER: -2.00
OS_AXIS: 104

## 2023-07-28 ASSESSMENT — VISUAL ACUITY
OD_BCVA: 20/30-2
OS_BCVA: 20/40-2

## 2023-07-28 ASSESSMENT — REFRACTION_CURRENTRX
OS_ADD: +2.50
OD_AXIS: 088
OD_VPRISM_DIRECTION: BF
OD_ADD: +2.50
OS_CYLINDER: -2.00
OS_OVR_VA: 20/
OS_SPHERE: +0.25
OD_SPHERE: -0.50
OS_VPRISM_DIRECTION: BF
OD_CYLINDER: -2.50
OS_AXIS: 114
OD_OVR_VA: 20/

## 2023-07-28 ASSESSMENT — CONFRONTATIONAL VISUAL FIELD TEST (CVF)
OD_FINDINGS: FULL
OS_FINDINGS: FULL

## 2023-07-28 ASSESSMENT — LID EXAM ASSESSMENTS
OD_BLEPHARITIS: RLL RUL 1+
OS_BLEPHARITIS: LLL LUL 1+

## 2023-07-28 ASSESSMENT — TEAR BREAK UP TIME (TBUT)
OS_TBUT: 6 SEC
OD_TBUT: 6 SEC

## 2023-07-28 ASSESSMENT — SUPERFICIAL PUNCTATE KERATITIS (SPK)
OD_SPK: 1+
OS_SPK: 1+ 2+

## 2023-07-28 ASSESSMENT — TONOMETRY
OD_IOP_MMHG: 20
OS_IOP_MMHG: 20

## 2024-01-09 ENCOUNTER — RX ONLY (RX ONLY)
Age: 79
End: 2024-01-09

## 2024-01-09 ENCOUNTER — OFFICE (OUTPATIENT)
Dept: URBAN - METROPOLITAN AREA CLINIC 104 | Facility: CLINIC | Age: 79
Setting detail: OPHTHALMOLOGY
End: 2024-01-09
Payer: MEDICARE

## 2024-01-09 DIAGNOSIS — H01.001: ICD-10-CM

## 2024-01-09 DIAGNOSIS — H16.223: ICD-10-CM

## 2024-01-09 DIAGNOSIS — H35.40: ICD-10-CM

## 2024-01-09 DIAGNOSIS — H25.13: ICD-10-CM

## 2024-01-09 DIAGNOSIS — H01.002: ICD-10-CM

## 2024-01-09 DIAGNOSIS — H01.004: ICD-10-CM

## 2024-01-09 DIAGNOSIS — H01.005: ICD-10-CM

## 2024-01-09 PROBLEM — H11.823 CONJUNCTIVOCHALASIS; BOTH EYES: Status: ACTIVE | Noted: 2024-01-09

## 2024-01-09 PROCEDURE — 92014 COMPRE OPH EXAM EST PT 1/>: CPT | Performed by: OPTOMETRIST

## 2024-01-09 ASSESSMENT — LID EXAM ASSESSMENTS
OS_BLEPHARITIS: LLL LUL 3+
OD_BLEPHARITIS: RLL RUL 3+

## 2024-01-09 ASSESSMENT — REFRACTION_AUTOREFRACTION
OS_SPHERE: UNABLE
OD_SPHERE: -0.25
OD_CYLINDER: -2.50
OD_AXIS: 074

## 2024-01-09 ASSESSMENT — SUPERFICIAL PUNCTATE KERATITIS (SPK)
OS_SPK: 1+ 2+
OD_SPK: 1+

## 2024-01-09 ASSESSMENT — DECREASING TEAR LAKE - SEVERITY SCORE
OD_DEC_TEARLAKE: 1+
OS_DEC_TEARLAKE: 1+

## 2024-01-09 ASSESSMENT — REFRACTION_MANIFEST
OD_ADD: +2.50
OS_ADD: +2.50
OD_AXIS: 080
OS_VA1: 20/40
OD_VA1: 20/40
OD_SPHERE: -0.50
OS_CYLINDER: -2.00
OS_SPHERE: +0.25
OD_CYLINDER: -2.50
OS_AXIS: 105

## 2024-01-09 ASSESSMENT — REFRACTION_CURRENTRX
OD_VPRISM_DIRECTION: PROGS
OS_SPHERE: +0.25
OD_OVR_VA: 20/
OD_CYLINDER: -2.50
OS_OVR_VA: 20/
OD_ADD: +2.50
OS_AXIS: 106
OD_AXIS: 082
OD_SPHERE: -0.50
OS_VPRISM_DIRECTION: PROGS
OS_ADD: +2.50
OS_CYLINDER: -2.00

## 2024-01-09 ASSESSMENT — CONFRONTATIONAL VISUAL FIELD TEST (CVF)
OS_FINDINGS: FULL
OD_FINDINGS: FULL

## 2024-01-09 ASSESSMENT — SPHEQUIV_DERIVED
OD_SPHEQUIV: -1.5
OS_SPHEQUIV: -0.75
OD_SPHEQUIV: -1.75

## 2024-06-20 ENCOUNTER — RX ONLY (RX ONLY)
Age: 79
End: 2024-06-20

## 2024-06-20 ENCOUNTER — OFFICE (OUTPATIENT)
Dept: URBAN - METROPOLITAN AREA CLINIC 104 | Facility: CLINIC | Age: 79
Setting detail: OPHTHALMOLOGY
End: 2024-06-20
Payer: MEDICARE

## 2024-06-20 DIAGNOSIS — H01.004: ICD-10-CM

## 2024-06-20 DIAGNOSIS — H16.223: ICD-10-CM

## 2024-06-20 DIAGNOSIS — H01.001: ICD-10-CM

## 2024-06-20 DIAGNOSIS — H01.002: ICD-10-CM

## 2024-06-20 DIAGNOSIS — H01.005: ICD-10-CM

## 2024-06-20 PROCEDURE — 99213 OFFICE O/P EST LOW 20 MIN: CPT | Performed by: OPTOMETRIST

## 2024-06-20 ASSESSMENT — CONFRONTATIONAL VISUAL FIELD TEST (CVF)
OS_FINDINGS: FULL
OD_FINDINGS: FULL

## 2024-06-20 ASSESSMENT — LID EXAM ASSESSMENTS
OS_BLEPHARITIS: LLL LUL 2+
OD_BLEPHARITIS: RLL RUL 2+

## 2024-08-19 ENCOUNTER — APPOINTMENT (OUTPATIENT)
Dept: NEUROLOGY | Facility: CLINIC | Age: 79
End: 2024-08-19

## 2024-10-28 ENCOUNTER — APPOINTMENT (OUTPATIENT)
Dept: NEUROLOGY | Facility: CLINIC | Age: 79
End: 2024-10-28
Payer: MEDICARE

## 2024-10-28 VITALS
HEART RATE: 80 BPM | DIASTOLIC BLOOD PRESSURE: 69 MMHG | WEIGHT: 169 LBS | BODY MASS INDEX: 28.85 KG/M2 | HEIGHT: 64 IN | SYSTOLIC BLOOD PRESSURE: 127 MMHG

## 2024-10-28 DIAGNOSIS — G31.84 MILD COGNITIVE IMPAIRMENT, SO STATED: ICD-10-CM

## 2024-10-28 PROCEDURE — G2211 COMPLEX E/M VISIT ADD ON: CPT

## 2024-10-28 PROCEDURE — 99214 OFFICE O/P EST MOD 30 MIN: CPT

## 2025-01-22 ENCOUNTER — APPOINTMENT (OUTPATIENT)
Dept: NEUROLOGY | Facility: CLINIC | Age: 80
End: 2025-01-22

## 2025-05-13 ENCOUNTER — OFFICE (OUTPATIENT)
Dept: URBAN - METROPOLITAN AREA CLINIC 104 | Facility: CLINIC | Age: 80
Setting detail: OPHTHALMOLOGY
End: 2025-05-13
Payer: MEDICARE

## 2025-05-13 DIAGNOSIS — H16.222: ICD-10-CM

## 2025-05-13 DIAGNOSIS — H01.002: ICD-10-CM

## 2025-05-13 DIAGNOSIS — H01.001: ICD-10-CM

## 2025-05-13 DIAGNOSIS — H16.221: ICD-10-CM

## 2025-05-13 DIAGNOSIS — H16.223: ICD-10-CM

## 2025-05-13 PROCEDURE — 92014 COMPRE OPH EXAM EST PT 1/>: CPT | Performed by: OPTOMETRIST

## 2025-05-13 PROCEDURE — 83861 MICROFLUID ANALY TEARS: CPT | Mod: QW,RT | Performed by: OPTOMETRIST

## 2025-05-13 PROCEDURE — 83861 MICROFLUID ANALY TEARS: CPT | Mod: QW,LT | Performed by: OPTOMETRIST

## 2025-05-13 ASSESSMENT — KERATOMETRY
OD_K1POWER_DIOPTERS: UTP
OS_K1POWER_DIOPTERS: UTP

## 2025-05-13 ASSESSMENT — VISUAL ACUITY
OS_BCVA: 20/30
OD_BCVA: 20/30

## 2025-05-13 ASSESSMENT — REFRACTION_AUTOREFRACTION
OD_SPHERE: -1.00
OS_SPHERE: PLANO
OS_AXIS: 106
OD_CYLINDER: -2.75
OS_CYLINDER: -1.00
OD_AXIS: 85

## 2025-05-13 ASSESSMENT — SUPERFICIAL PUNCTATE KERATITIS (SPK)
OS_SPK: 1+ 2+
OD_SPK: 1+ 2+

## 2025-05-13 ASSESSMENT — REFRACTION_CURRENTRX
OS_CYLINDER: -2.00
OS_OVR_VA: 20/
OD_OVR_VA: 20/
OD_SPHERE: -0.50
OS_ADD: +1.75
OD_CYLINDER: -2.50
OD_AXIS: 81
OS_VPRISM_DIRECTION: BF
OS_AXIS: 111
OS_SPHERE: +0.25
OD_ADD: +1.75
OD_VPRISM_DIRECTION: BF

## 2025-05-13 ASSESSMENT — DECREASING TEAR LAKE - SEVERITY SCORE
OS_DEC_TEARLAKE: 1+
OD_DEC_TEARLAKE: 1+

## 2025-05-13 ASSESSMENT — LID EXAM ASSESSMENTS
OS_BLEPHARITIS: LLL LUL 2+ 3+
OD_BLEPHARITIS: RLL RUL 2+ 3+

## 2025-05-13 ASSESSMENT — CONFRONTATIONAL VISUAL FIELD TEST (CVF)
OD_FINDINGS: FULL
OS_FINDINGS: FULL

## 2025-06-24 ENCOUNTER — OFFICE (OUTPATIENT)
Dept: URBAN - METROPOLITAN AREA CLINIC 104 | Facility: CLINIC | Age: 80
Setting detail: OPHTHALMOLOGY
End: 2025-06-24
Payer: MEDICARE

## 2025-06-24 DIAGNOSIS — H01.005: ICD-10-CM

## 2025-06-24 DIAGNOSIS — H01.001: ICD-10-CM

## 2025-06-24 DIAGNOSIS — H35.40: ICD-10-CM

## 2025-06-24 DIAGNOSIS — H16.223: ICD-10-CM

## 2025-06-24 DIAGNOSIS — H25.13: ICD-10-CM

## 2025-06-24 DIAGNOSIS — H01.002: ICD-10-CM

## 2025-06-24 DIAGNOSIS — H01.004: ICD-10-CM

## 2025-06-24 PROCEDURE — 99213 OFFICE O/P EST LOW 20 MIN: CPT | Performed by: OPTOMETRIST

## 2025-06-24 ASSESSMENT — KERATOMETRY
OS_K1POWER_DIOPTERS: UTP
OD_K1POWER_DIOPTERS: UTP

## 2025-06-24 ASSESSMENT — REFRACTION_CURRENTRX
OS_OVR_VA: 20/
OS_CYLINDER: -2.00
OD_CYLINDER: -2.50
OD_AXIS: 81
OS_VPRISM_DIRECTION: BF
OD_ADD: +1.75
OS_SPHERE: +0.25
OD_VPRISM_DIRECTION: BF
OD_SPHERE: -0.50
OS_ADD: +1.75
OS_AXIS: 111
OD_OVR_VA: 20/

## 2025-06-24 ASSESSMENT — CONFRONTATIONAL VISUAL FIELD TEST (CVF)
OS_FINDINGS: FULL
OD_FINDINGS: FULL

## 2025-06-24 ASSESSMENT — REFRACTION_AUTOREFRACTION
OD_SPHERE: -1.00
OS_SPHERE: PLANO
OS_CYLINDER: -1.00
OD_CYLINDER: -2.75
OD_AXIS: 85
OS_AXIS: 106

## 2025-06-24 ASSESSMENT — LID EXAM ASSESSMENTS
OS_BLEPHARITIS: LLL LUL 2+ 3+
OD_BLEPHARITIS: RLL RUL 2+ 3+

## 2025-06-24 ASSESSMENT — SUPERFICIAL PUNCTATE KERATITIS (SPK)
OD_SPK: T 1+
OS_SPK: T 1+

## 2025-06-24 ASSESSMENT — DECREASING TEAR LAKE - SEVERITY SCORE
OS_DEC_TEARLAKE: 1+
OD_DEC_TEARLAKE: 1+

## 2025-06-24 ASSESSMENT — VISUAL ACUITY
OS_BCVA: 20/30
OD_BCVA: 20/30

## 2025-09-17 ENCOUNTER — APPOINTMENT (OUTPATIENT)
Dept: NEUROLOGY | Facility: CLINIC | Age: 80
End: 2025-09-17
Payer: MEDICARE

## 2025-09-17 VITALS
WEIGHT: 169 LBS | HEIGHT: 64 IN | SYSTOLIC BLOOD PRESSURE: 127 MMHG | DIASTOLIC BLOOD PRESSURE: 70 MMHG | HEART RATE: 79 BPM | BODY MASS INDEX: 28.85 KG/M2

## 2025-09-17 DIAGNOSIS — G60.9 HEREDITARY AND IDIOPATHIC NEUROPATHY, UNSPECIFIED: ICD-10-CM

## 2025-09-17 DIAGNOSIS — F02.80 ALZHEIMER'S DISEASE WITH LATE ONSET: ICD-10-CM

## 2025-09-17 DIAGNOSIS — G30.1 ALZHEIMER'S DISEASE WITH LATE ONSET: ICD-10-CM

## 2025-09-17 PROCEDURE — 99214 OFFICE O/P EST MOD 30 MIN: CPT

## 2025-09-17 PROCEDURE — G2211 COMPLEX E/M VISIT ADD ON: CPT
